# Patient Record
Sex: MALE | Race: WHITE
[De-identification: names, ages, dates, MRNs, and addresses within clinical notes are randomized per-mention and may not be internally consistent; named-entity substitution may affect disease eponyms.]

---

## 2018-12-17 ENCOUNTER — HOSPITAL ENCOUNTER (EMERGENCY)
Dept: HOSPITAL 75 - ER | Age: 23
Discharge: HOME | End: 2018-12-17
Payer: COMMERCIAL

## 2018-12-17 VITALS — DIASTOLIC BLOOD PRESSURE: 87 MMHG | SYSTOLIC BLOOD PRESSURE: 116 MMHG

## 2018-12-17 VITALS — HEIGHT: 68 IN | WEIGHT: 192.5 LBS | BODY MASS INDEX: 29.18 KG/M2

## 2018-12-17 DIAGNOSIS — F17.210: ICD-10-CM

## 2018-12-17 DIAGNOSIS — F41.9: ICD-10-CM

## 2018-12-17 DIAGNOSIS — F32.9: ICD-10-CM

## 2018-12-17 DIAGNOSIS — Z90.89: ICD-10-CM

## 2018-12-17 DIAGNOSIS — R40.2142: ICD-10-CM

## 2018-12-17 DIAGNOSIS — Z04.1: Primary | ICD-10-CM

## 2018-12-17 DIAGNOSIS — Z91.5: ICD-10-CM

## 2018-12-17 DIAGNOSIS — R40.2242: ICD-10-CM

## 2018-12-17 DIAGNOSIS — F12.10: ICD-10-CM

## 2018-12-17 DIAGNOSIS — F10.129: ICD-10-CM
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
## 2018-12-17 DIAGNOSIS — R40.2362: ICD-10-CM

## 2018-12-17 LAB
ALBUMIN SERPL-MCNC: 4.9 GM/DL (ref 3.2–4.5)
ALP SERPL-CCNC: 67 U/L (ref 40–136)
ALT SERPL-CCNC: 17 U/L (ref 0–55)
APTT PPP: YELLOW S
BACTERIA #/AREA URNS HPF: NEGATIVE /HPF
BARBITURATES UR QL: NEGATIVE
BASOPHILS # BLD AUTO: 0 10^3/UL (ref 0–0.1)
BASOPHILS NFR BLD AUTO: 0 % (ref 0–10)
BENZODIAZ UR QL SCN: NEGATIVE
BILIRUB SERPL-MCNC: 0.5 MG/DL (ref 0.1–1)
BILIRUB UR QL STRIP: NEGATIVE
BUN/CREAT SERPL: 12
CALCIUM SERPL-MCNC: 9.1 MG/DL (ref 8.5–10.1)
CHLORIDE SERPL-SCNC: 107 MMOL/L (ref 98–107)
CO2 SERPL-SCNC: 24 MMOL/L (ref 21–32)
COCAINE UR QL: NEGATIVE
CREAT SERPL-MCNC: 0.94 MG/DL (ref 0.6–1.3)
EOSINOPHIL # BLD AUTO: 0 10^3/UL (ref 0–0.3)
EOSINOPHIL NFR BLD AUTO: 0 % (ref 0–10)
ERYTHROCYTE [DISTWIDTH] IN BLOOD BY AUTOMATED COUNT: 13 % (ref 10–14.5)
FIBRINOGEN PPP-MCNC: CLEAR MG/DL
GFR SERPLBLD BASED ON 1.73 SQ M-ARVRAT: > 60 ML/MIN
GLUCOSE SERPL-MCNC: 132 MG/DL (ref 70–105)
GLUCOSE UR STRIP-MCNC: NEGATIVE MG/DL
HCT VFR BLD CALC: 43 % (ref 40–54)
HGB BLD-MCNC: 14.1 G/DL (ref 13.3–17.7)
KETONES UR QL STRIP: NEGATIVE
LEUKOCYTE ESTERASE UR QL STRIP: NEGATIVE
LYMPHOCYTES # BLD AUTO: 2.1 X 10^3 (ref 1–4)
LYMPHOCYTES NFR BLD AUTO: 29 % (ref 12–44)
MANUAL DIFFERENTIAL PERFORMED BLD QL: NO
MCH RBC QN AUTO: 29 PG (ref 25–34)
MCHC RBC AUTO-ENTMCNC: 33 G/DL (ref 32–36)
MCV RBC AUTO: 87 FL (ref 80–99)
METHADONE UR QL SCN: NEGATIVE
METHAMPHETAMINE SCREEN URINE S: NEGATIVE
MONOCYTES # BLD AUTO: 0.5 X 10^3 (ref 0–1)
MONOCYTES NFR BLD AUTO: 7 % (ref 0–12)
NEUTROPHILS # BLD AUTO: 4.6 X 10^3 (ref 1.8–7.8)
NEUTROPHILS NFR BLD AUTO: 64 % (ref 42–75)
NITRITE UR QL STRIP: NEGATIVE
OPIATES UR QL SCN: NEGATIVE
OXYCODONE UR QL: NEGATIVE
PH UR STRIP: 5 [PH] (ref 5–9)
PLATELET # BLD: 232 10^3/UL (ref 130–400)
PMV BLD AUTO: 10.2 FL (ref 7.4–10.4)
POTASSIUM SERPL-SCNC: 4.3 MMOL/L (ref 3.6–5)
PROPOXYPH UR QL: NEGATIVE
PROT SERPL-MCNC: 7.8 GM/DL (ref 6.4–8.2)
PROT UR QL STRIP: NEGATIVE
RBC # BLD AUTO: 4.92 10^6/UL (ref 4.35–5.85)
RBC #/AREA URNS HPF: (no result) /HPF
SODIUM SERPL-SCNC: 144 MMOL/L (ref 135–145)
SP GR UR STRIP: 1.01 (ref 1.02–1.02)
SQUAMOUS #/AREA URNS HPF: (no result) /HPF
TRICYCLICS UR QL SCN: NEGATIVE
UROBILINOGEN UR-MCNC: NORMAL MG/DL
WBC # BLD AUTO: 7.2 10^3/UL (ref 4.3–11)
WBC #/AREA URNS HPF: (no result) /HPF

## 2018-12-17 PROCEDURE — 81000 URINALYSIS NONAUTO W/SCOPE: CPT

## 2018-12-17 PROCEDURE — 71045 X-RAY EXAM CHEST 1 VIEW: CPT

## 2018-12-17 PROCEDURE — 70450 CT HEAD/BRAIN W/O DYE: CPT

## 2018-12-17 PROCEDURE — 80053 COMPREHEN METABOLIC PANEL: CPT

## 2018-12-17 PROCEDURE — 72170 X-RAY EXAM OF PELVIS: CPT

## 2018-12-17 PROCEDURE — 36415 COLL VENOUS BLD VENIPUNCTURE: CPT

## 2018-12-17 PROCEDURE — 85025 COMPLETE CBC W/AUTO DIFF WBC: CPT

## 2018-12-17 PROCEDURE — 80320 DRUG SCREEN QUANTALCOHOLS: CPT

## 2018-12-17 PROCEDURE — 72125 CT NECK SPINE W/O DYE: CPT

## 2018-12-17 PROCEDURE — 80306 DRUG TEST PRSMV INSTRMNT: CPT

## 2018-12-17 NOTE — DIAGNOSTIC IMAGING REPORT
PROCEDURE: CT head and CT cervical spine without contrast.



TECHNIQUE: Multiple contiguous axial images were obtained through

the brain and cervical spine without the use of intravenous

contrast. Sagittal and coronal reformations through the cervical

spine were then performed.



INDICATION:  Trauma, head and neck pain.



COMPARISON:  None 



CT head:



Ventricles normal in size shape and position.  There is no

midline shift or mass effect.  There is no hemorrhage or evidence

acute ischemia. No extra axial fluid collection is seen. The bony

calvarium, visualized paranasal sinuses and mastoids are normal.



IMPRESSION: Negative CT head



CT cervical spine:



Alignment is normal.  There is no subluxation or fracture. No

degeneration is seen. There is no paraspinous mass.



IMPRESSION: No traumatic malalignment or fracture.  Agree with

preliminary report.



 



Dictated by: 



  Dictated on workstation # [id number]

## 2018-12-17 NOTE — XMS REPORT
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 neutrophils automated count (number/volume)            12.7 10*3        
    1.8-7.8        

 

 Blood lymphocytes automated count (number/volume)            2.1 10*3         
   1.0-4.0        

 

 Blood monocytes automated count (number/volume)            0.8 10*3            
0.0-1.0        

 

 Automated eosinophil count            0.0 10*3/uL            0.0-0.3        

 

 Automated blood basophil count (count/volume)            0.0 10*3/uL          
  0.0-0.1        









 Whole blood basic metabolic panel - 16 04:00         









 Serum or plasma sodium measurement (moles/volume)            140 mmol/L       
     135-145        

 

 Serum or plasma potassium measurement (moles/volume)            3.7 mmol/L    
        3.6-5.0        

 

 Serum or plasma chloride measurement (moles/volume)            107 mmol/L     
               

 

 Carbon dioxide            20 mmol/L            21-32        

 

 Serum or plasma anion gap determination (moles/volume)            13 mmol/L   
         5-14        

 

 Serum or plasma urea nitrogen measurement (mass/volume)            8 mg/dL    
        7-18        

 

 Serum or plasma creatinine measurement (mass/volume)            0.77 mg/dL    
        0.60-1.30        

 

 Serum or plasma urea nitrogen/creatinine mass ratio            10             
NRG        

 

 Serum or plasma creatinine measurement with calculation of estimated 
glomerular filtration rate            >             NRG        

 

 Serum or plasma glucose measurement (mass/volume)            134 mg/dL        
            

 

 Serum or plasma calcium measurement (mass/volume)            8.9 mg/dL        
    8.5-10.1        









 Serum or plasma phosphate measurement (mass/volume) - 16 04:00         









 Serum or plasma phosphate measurement (mass/volume)            2.7 mg/dL      
      2.3-4.7        









 Magnesium - 16 04:00         









 Magnesium            2.0 mg/dL            1.8-2.4        









 Blood manual differential performed detection - 16 04:00         









 Blood monocytes/100 leukocytes            4 %            NRG        

 

 Manual blood segmented neutrophils/100 leukocytes            82 %            
NRG        

 

 Blood band neutrophils/100 leukocytes            2 %            NRG        

 

 Manual blood lymphocytes/100 leukocytes            10 %            NRG        

 

 Manual eosinophils/100 leukocytes in nose            0 %            NRG        

 

 Manual blood basophils/100 leukocytes            0 %            NRG        

 

 Blood lymphocytes variant/100 leukocytes            2 %            NRG        

 

 Blood erythrocyte morphology finding identification            NORMAL         
    NRG        



                                                        



Encounters

      





 ACCT No.            Visit Date/Time            Discharge            Status    
        Pt. Type            Provider            Facility            Loc./Unit  
          Complaint        

 

 [id number]            2016 22:41:00            2016 14:45:00    
        DIS            Inpatient            VICTOR HUGO BENNETT DO            [address] 
[facility]            ICU            CAFFIENE OVERDOSE,
SUICIDE ATTEMPT,HYPOKALEMIA        

 

 [id number]            2016 11:32:00            2016 14:40:00    
        DIS            Emergency            DORITA TOLBERT DO            [address] 
[facility]            ER            OVERDOSE        

 

 [id number]            2014 02:16:00            2014 02:52:00    
        DIS            Emergency            GREGORY IQBAL MD            [address] 
[facility]            ER            L EAR INJ        

 

 [id number]            10/11/2011 17:17:00                                   
   Document Registration                                                       
     

 

 [id number]            10/01/2011 22:01:00                                   
   Document Registration                                                       
     

 

 [id number]            2014 02:20:04                         [place]            
Document Registration

## 2018-12-17 NOTE — DIAGNOSTIC IMAGING REPORT
INDICATION: Trauma.



COMPARISON: 11/29/2016.



FINDINGS: A single view of the chest demonstrates clear lungs

bilaterally. The heart is normal. There is no pneumothorax.

Osseous structures are normal.



IMPRESSION: Negative chest.



Dictated by: 



  Dictated on workstation # [id number]
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
## 2018-12-17 NOTE — DIAGNOSTIC IMAGING REPORT
INDICATION: Trauma, pelvic pain.



COMPARISON: None.



FINDINGS: Single view of the pelvis demonstrates no fracture or

dislocation. Articular surfaces are normal.



IMPRESSION: Negative pelvis.



Dictated by: 



  Dictated on workstation # [id number]

## 2021-02-06 ENCOUNTER — HOSPITAL ENCOUNTER (EMERGENCY)
Dept: HOSPITAL 75 - ER | Age: 26
Discharge: HOME | End: 2021-02-06
Payer: COMMERCIAL

## 2021-02-06 VITALS — HEIGHT: 68.11 IN | WEIGHT: 220.02 LBS | BODY MASS INDEX: 33.35 KG/M2

## 2021-02-06 VITALS — DIASTOLIC BLOOD PRESSURE: 106 MMHG | SYSTOLIC BLOOD PRESSURE: 145 MMHG

## 2021-02-06 DIAGNOSIS — Z82.49: ICD-10-CM

## 2021-02-06 DIAGNOSIS — Z23: ICD-10-CM

## 2021-02-06 DIAGNOSIS — F17.290: ICD-10-CM

## 2021-02-06 DIAGNOSIS — S09.90XA: ICD-10-CM

## 2021-02-06 DIAGNOSIS — S01.01XA: Primary | ICD-10-CM
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
## 2021-02-06 DIAGNOSIS — F17.210: ICD-10-CM

## 2021-02-06 PROCEDURE — 99284 EMERGENCY DEPT VISIT MOD MDM: CPT

## 2021-02-06 PROCEDURE — 90715 TDAP VACCINE 7 YRS/> IM: CPT
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
## 2022-03-15 ENCOUNTER — HOSPITAL ENCOUNTER (OUTPATIENT)
Dept: HOSPITAL 75 - ER | Age: 27
Setting detail: OBSERVATION
LOS: 2 days | Discharge: HOME | End: 2022-03-17
Attending: ORTHOPAEDIC SURGERY | Admitting: ORTHOPAEDIC SURGERY
Payer: COMMERCIAL

## 2022-03-15 VITALS — DIASTOLIC BLOOD PRESSURE: 80 MMHG | SYSTOLIC BLOOD PRESSURE: 126 MMHG

## 2022-03-15 VITALS — SYSTOLIC BLOOD PRESSURE: 144 MMHG | DIASTOLIC BLOOD PRESSURE: 92 MMHG

## 2022-03-15 VITALS — DIASTOLIC BLOOD PRESSURE: 106 MMHG | SYSTOLIC BLOOD PRESSURE: 158 MMHG

## 2022-03-15 VITALS — DIASTOLIC BLOOD PRESSURE: 98 MMHG | SYSTOLIC BLOOD PRESSURE: 156 MMHG

## 2022-03-15 VITALS — DIASTOLIC BLOOD PRESSURE: 89 MMHG | SYSTOLIC BLOOD PRESSURE: 157 MMHG

## 2022-03-15 VITALS — DIASTOLIC BLOOD PRESSURE: 99 MMHG | SYSTOLIC BLOOD PRESSURE: 147 MMHG

## 2022-03-15 VITALS — SYSTOLIC BLOOD PRESSURE: 154 MMHG | DIASTOLIC BLOOD PRESSURE: 101 MMHG

## 2022-03-15 VITALS — SYSTOLIC BLOOD PRESSURE: 143 MMHG | DIASTOLIC BLOOD PRESSURE: 95 MMHG

## 2022-03-15 VITALS — HEIGHT: 67.72 IN | BODY MASS INDEX: 33.8 KG/M2 | WEIGHT: 220.46 LBS

## 2022-03-15 VITALS — SYSTOLIC BLOOD PRESSURE: 157 MMHG | DIASTOLIC BLOOD PRESSURE: 99 MMHG

## 2022-03-15 VITALS — SYSTOLIC BLOOD PRESSURE: 159 MMHG | DIASTOLIC BLOOD PRESSURE: 75 MMHG

## 2022-03-15 VITALS — DIASTOLIC BLOOD PRESSURE: 100 MMHG | SYSTOLIC BLOOD PRESSURE: 159 MMHG

## 2022-03-15 DIAGNOSIS — F12.10: ICD-10-CM

## 2022-03-15 DIAGNOSIS — S82.202A: Primary | ICD-10-CM

## 2022-03-15 DIAGNOSIS — F10.10: ICD-10-CM

## 2022-03-15 DIAGNOSIS — E66.9: ICD-10-CM
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
## 2022-03-15 DIAGNOSIS — I10: ICD-10-CM

## 2022-03-15 DIAGNOSIS — E72.51: ICD-10-CM

## 2022-03-15 DIAGNOSIS — S82.402A: ICD-10-CM

## 2022-03-15 LAB
ALBUMIN SERPL-MCNC: 4.8 GM/DL (ref 3.2–4.5)
ALP SERPL-CCNC: 68 U/L (ref 40–136)
ALT SERPL-CCNC: 46 U/L (ref 0–55)
BARBITURATES UR QL: NEGATIVE
BASOPHILS # BLD AUTO: 0.1 10^3/UL (ref 0–0.1)
BASOPHILS NFR BLD AUTO: 1 % (ref 0–10)
BENZODIAZ UR QL SCN: NEGATIVE
BILIRUB SERPL-MCNC: 0.4 MG/DL (ref 0.1–1)
BUN/CREAT SERPL: 10
CALCIUM SERPL-MCNC: 8.9 MG/DL (ref 8.5–10.1)
CHLORIDE SERPL-SCNC: 106 MMOL/L (ref 98–107)
CO2 SERPL-SCNC: 18 MMOL/L (ref 21–32)
COCAINE UR QL: NEGATIVE
CREAT SERPL-MCNC: 0.9 MG/DL (ref 0.6–1.3)
EOSINOPHIL # BLD AUTO: 0 10^3/UL (ref 0–0.3)
EOSINOPHIL NFR BLD AUTO: 0 % (ref 0–10)
GFR SERPLBLD BASED ON 1.73 SQ M-ARVRAT: 122 ML/MIN
GLUCOSE SERPL-MCNC: 136 MG/DL (ref 70–105)
HCT VFR BLD CALC: 47 % (ref 40–54)
HGB BLD-MCNC: 16 G/DL (ref 13.3–17.7)
LYMPHOCYTES # BLD AUTO: 3.4 10^3/UL (ref 1–4)
LYMPHOCYTES NFR BLD AUTO: 35 % (ref 12–44)
MANUAL DIFFERENTIAL PERFORMED BLD QL: NO
MCH RBC QN AUTO: 30 PG (ref 25–34)
MCHC RBC AUTO-ENTMCNC: 34 G/DL (ref 32–36)
MCV RBC AUTO: 89 FL (ref 80–99)
METHADONE UR QL SCN: NEGATIVE
METHAMPHETAMINE SCREEN URINE S: NEGATIVE
MONOCYTES # BLD AUTO: 0.7 10^3/UL (ref 0–1)
MONOCYTES NFR BLD AUTO: 7 % (ref 0–12)
NEUTROPHILS # BLD AUTO: 5.6 10^3/UL (ref 1.8–7.8)
NEUTROPHILS NFR BLD AUTO: 57 % (ref 42–75)
OPIATES UR QL SCN: POSITIVE
OXYCODONE UR QL: NEGATIVE
PLATELET # BLD: 279 10^3/UL (ref 130–400)
PMV BLD AUTO: 9.5 FL (ref 9–12.2)
POTASSIUM SERPL-SCNC: 4 MMOL/L (ref 3.6–5)
PROPOXYPH UR QL: NEGATIVE
PROT SERPL-MCNC: 8 GM/DL (ref 6.4–8.2)
SODIUM SERPL-SCNC: 140 MMOL/L (ref 135–145)
TRICYCLICS UR QL SCN: NEGATIVE
WBC # BLD AUTO: 10.9 10^3/UL (ref 4.3–11)

## 2022-03-15 PROCEDURE — 80053 COMPREHEN METABOLIC PANEL: CPT

## 2022-03-15 PROCEDURE — 96374 THER/PROPH/DIAG INJ IV PUSH: CPT

## 2022-03-15 PROCEDURE — 85014 HEMATOCRIT: CPT

## 2022-03-15 PROCEDURE — 36415 COLL VENOUS BLD VENIPUNCTURE: CPT

## 2022-03-15 PROCEDURE — 73590 X-RAY EXAM OF LOWER LEG: CPT

## 2022-03-15 PROCEDURE — 83036 HEMOGLOBIN GLYCOSYLATED A1C: CPT

## 2022-03-15 PROCEDURE — 29505 APPLICATION LONG LEG SPLINT: CPT

## 2022-03-15 PROCEDURE — 85018 HEMOGLOBIN: CPT

## 2022-03-15 PROCEDURE — 80320 DRUG SCREEN QUANTALCOHOLS: CPT

## 2022-03-15 PROCEDURE — 85025 COMPLETE CBC W/AUTO DIFF WBC: CPT

## 2022-03-15 PROCEDURE — 80306 DRUG TEST PRSMV INSTRMNT: CPT

## 2022-03-15 PROCEDURE — 87081 CULTURE SCREEN ONLY: CPT

## 2022-03-15 PROCEDURE — 76000 FLUOROSCOPY <1 HR PHYS/QHP: CPT

## 2022-03-15 RX ADMIN — SODIUM CHLORIDE, SODIUM LACTATE, POTASSIUM CHLORIDE, AND CALCIUM CHLORIDE SCH MLS/HR: 600; 310; 30; 20 INJECTION, SOLUTION INTRAVENOUS at 08:19

## 2022-03-15 RX ADMIN — MORPHINE SULFATE PRN MG: 10 INJECTION, SOLUTION INTRAMUSCULAR; INTRAVENOUS at 17:40

## 2022-03-15 RX ADMIN — SODIUM CHLORIDE, SODIUM LACTATE, POTASSIUM CHLORIDE, AND CALCIUM CHLORIDE PRN MLS/HR: 600; 310; 30; 20 INJECTION, SOLUTION INTRAVENOUS at 12:15

## 2022-03-15 RX ADMIN — SODIUM CHLORIDE, SODIUM LACTATE, POTASSIUM CHLORIDE, AND CALCIUM CHLORIDE SCH MLS/HR: 600; 310; 30; 20 INJECTION, SOLUTION INTRAVENOUS at 15:39

## 2022-03-15 RX ADMIN — OXYCODONE HYDROCHLORIDE AND ACETAMINOPHEN PRN TAB: 5; 325 TABLET ORAL at 20:37

## 2022-03-15 RX ADMIN — OXYCODONE HYDROCHLORIDE AND ACETAMINOPHEN PRN TAB: 5; 325 TABLET ORAL at 16:35

## 2022-03-15 RX ADMIN — MORPHINE SULFATE PRN MG: 10 INJECTION, SOLUTION INTRAMUSCULAR; INTRAVENOUS at 10:31

## 2022-03-15 RX ADMIN — CEFAZOLIN SODIUM SCH MLS/HR: 2 SOLUTION INTRAVENOUS at 19:40

## 2022-03-15 RX ADMIN — MORPHINE SULFATE PRN MG: 10 INJECTION, SOLUTION INTRAMUSCULAR; INTRAVENOUS at 08:20

## 2022-03-15 RX ADMIN — MORPHINE SULFATE PRN MG: 10 INJECTION, SOLUTION INTRAMUSCULAR; INTRAVENOUS at 15:20

## 2022-03-15 RX ADMIN — CEFAZOLIN SODIUM SCH MLS/HR: 2 SOLUTION INTRAVENOUS at 18:24

## 2022-03-15 RX ADMIN — MORPHINE SULFATE PRN MG: 10 INJECTION, SOLUTION INTRAMUSCULAR; INTRAVENOUS at 19:41

## 2022-03-15 RX ADMIN — SODIUM CHLORIDE, SODIUM LACTATE, POTASSIUM CHLORIDE, AND CALCIUM CHLORIDE SCH MLS/HR: 600; 310; 30; 20 INJECTION, SOLUTION INTRAVENOUS at 20:37

## 2022-03-15 RX ADMIN — MORPHINE SULFATE PRN MG: 10 INJECTION, SOLUTION INTRAMUSCULAR; INTRAVENOUS at 23:40

## 2022-03-15 RX ADMIN — SODIUM CHLORIDE, SODIUM LACTATE, POTASSIUM CHLORIDE, AND CALCIUM CHLORIDE PRN MLS/HR: 600; 310; 30; 20 INJECTION, SOLUTION INTRAVENOUS at 11:12

## 2022-03-15 NOTE — DIAGNOSTIC IMAGING REPORT
INDICATION:  Jogging and tripped in a pothole, pain.



TECHNIQUE:  AP and lateral views of the left tibia and fibula  



CORRELATION STUDY:  None



FINDINGS: 

Predominantly transversely oriented about the comminuted

fractures involving the mid diaphysis of the tibia and fibula.

More prominent bony fragmentation at the fibular fracture

fragment. There is a lateral displacement of the main distal

fracture fragments approximately half the width of the bone. Also

slight dorsal angulation present. Limited visualized portion of

the knee and ankle maintained. 

Soft tissue edema in and around the fractures. No definitive

foreign body or abnormal gas collection.



IMPRESSION: 

1.Transversely oriented, comminuted slightly displaced and

angulated fractures of the mid tibia and fibula.



Dictated by: 



  Dictated on workstation # [id number]

## 2022-03-15 NOTE — DIAGNOSTIC IMAGING REPORT
INDICATION:  Fracture, postreduction/splinting



TECHNIQUE:  AP and lateral views of the left tibia and fibula

6:40 AM



CORRELATION STUDY:  Same day



FINDINGS: 

Comminuted, transversely oriented fractures of the tibia and

fibula shafts are again demonstrated. There is a very slight

anterior and lateral displacement of distal fracture fragments

but overall improved post reduction. No significant angulation.

Splint material has been placed.



IMPRESSION: 

1.Comminuted, transversely oriented fractures of the mid and left

tibia and fibula. Very slight anterior lateral displacement of

distal fracture fragments postreduction.



Dictated by: 



  Dictated on workstation # [id number]

## 2022-03-15 NOTE — ED LOWER EXTREMITY
General


Chief Complaint:  Lower Extremity


Stated Complaint:  LEFT LEG INJURY,TRIPPED IN POT HOLE


Nursing Triage Note:  


Pt brought to ER via wheelchair. Pt states he was jogging and he fell into a pot




hole. He is c/o severe pain to the left tib/fib and there is obvious unstable 


deformity present with swelling and bruising. There is pedal pulses present. 


Patient also has multiple abrasions to the right elbow. Patient states last oral




intake was at 21:00 and it was a tall bud light beer.


Source:  patient


Exam Limitations:  no limitations





History of Present Illness


Date Seen by Provider:  Mar 15, 2022


Time Seen by Provider:  06:00


Initial Comments


This 27-year-old young man presents to the emergency room with injury to the 

left lower leg with obvious deformity.  Last night was his birthday and he 

celebrated with excessive alcohol consumption.  He went out to take a walk 

behind his home and stepped in a pothole causing the injury.  He has abrasions 

to the right elbow without any apparent bony injury.  He denies any injury to 

his head, neck, or any other body part.  He initially reported n.p.o. time of 

2200, but his wife corrects that to 0500 when she took his last beer away from 

him.  Last solid food was 1800.  Patient reports he used to drink 3 or more days

per week, but in recent weeks that has been reduced.  He denies daily alcohol 

consumption or alcohol dependence.  He denies drug use but there did appear to 

be marijuana in his pocket that fell out during assessment.





Allergies and Home Medications


Allergies


Coded Allergies:  


     No Known Drug Allergies (Unverified , 10/1/11)





Patient Home Medication List


Home Medication List Reviewed:  Yes


No Active Prescriptions or Reported Meds





Review of Systems


Constitutional:  see HPI, other (Alcohol intoxication)


EENTM:  no symptoms reported


Respiratory:  no symptoms reported


Cardiovascular:  no symptoms reported


Gastrointestinal:  no symptoms reported


Genitourinary:  no symptoms reported


Musculoskeletal:  see HPI


Skin:  no symptoms reported


Psychiatric/Neurological:  See HPI (Alcohol intoxication)





Past Medical-Social-Family Hx


Patient Social History


Tobacco Use?:  No


Smokeless Tobacco Frequency:  Never a User


Use of E-Cig and/or Vaping dev:  No


Substance use?:  Yes


Substance type:  Marijuana


Additional substance use comme:  last marijuana use was 2 days ago


Substance frequency:  Couple times a week


Alcohol Use?:  Yes


Alcohol type:  Beer


Pt feels they are or have been:  No





Immunizations Up To Date


Tetanus Booster (TDap):  Unknown


PED Vaccines UTD:  No


Influenza Vaccine Up-to-Date:  No; Not Current





Seasonal Allergies


Seasonal Allergies:  No





Past Medical History


Surgery/Hospitalization HX:  


tear duct surgery


Surgeries:  Yes (LACRIMAL DUCT, WISDOM TEETH)


Tonsillectomy


Respiratory:  No


Cardiac:  No


Neurological:  No


Reproductive Disorders:  No


Sexually Transmitted Disease:  No


HIV/AIDS:  No


Genitourinary:  No


Gastrointestinal:  No


Musculoskeletal:  No


Endocrine:  No


HEENT:  No


Cancer:  No


Psychosocial:  Yes (History of alcohol abuse)


Anxiety, Suicide Attempts, Depression


Integumentary:  No


Blood Disorders:  No


Adverse Reaction/Blood Tranf:  No





Family Medical History





Hypertension


  19 FATHER


Psychosocial problem


  21 MOTHER





Physical Exam


Vital Signs





Vital Signs - First Documented








 3/15/22





 05:54


 


Temp 36.3


 


Pulse 113


 


Resp 22


 


B/P (MAP) 135/75 (95)


 


Pulse Ox 96


 


O2 Delivery Room Air





Capillary Refill : Less Than 3 Seconds


Height, Weight, BMI


Height: 5'8.00"


Weight: 192lbs. 8.0oz. 87.546095is; 33.00 BMI


Method:Estimated


General Appearance:  WD/WN, moderate distress


HEENT:  PERRL/EOMI, normal ENT inspection, other (Oropharynx somewhat dry)


Neck:  normal inspection


Cardiovascular:  no murmur, tachycardia, other (Palpable dorsal pedal pulse in 

the left foot)


Respiratory:  lungs clear, normal breath sounds, no respiratory distress


Gastrointestinal:  normal bowel sounds, non tender, soft


Hips:  bilateral hip non-tender, bilateral hip normal inspection


Legs:  right leg non-tender, right leg normal inspection, right leg normal range

of motion; left leg bone tenderness, left leg deformity, left leg ecchymosis, 

left leg pain


Knees:  bilateral knee non-tender, bilateral knee normal inspection, bilateral 

knee no evidence of injury


Ankles:  bilateral ankle non-tender, bilateral ankle normal inspection, hemal

ateral ankle normal range of motion, bilateral ankle no evidence of injury


Feet:  bilateral foot non-tender, bilateral foot normal inspection, bilateral 

foot normal range of motion, bilateral foot no evidence of injury


Neurologic/Tendon:  normal sensation, normal tendon functions


Neurologic/Psychiatric:  CNs II-XII nml as tested, no motor/sensory deficits, 

alert, normal mood/affect, oriented x 3, other (Intoxicated but alert and 

oriented)


Skin:  normal color, warm/dry





Procedures/Interventions


Splinting and Joint Reduction :  


   Pre-Proc Neuro Vasc Exam:  normal


   Post-Proc Neuro Vasc Exam:  normal


   Hand-Made Type:  fiberglass


   Splint Application:  Long Leg (Posterior splint applied just past the knee 

with stirrup added)





Progress/Results/Core Measures


Results/Orders


Lab Results





Laboratory Tests








Test


 3/15/22


00:00 3/15/22


06:03 Range/Units


 


 


White Blood Count


 


 10.9 


 4.3-11.0


10^3/uL


 


Red Blood Count


 


 5.33 


 4.30-5.52


10^6/uL


 


Hemoglobin  16.0  13.3-17.7  g/dL


 


Hematocrit  47  40-54  %


 


Mean Corpuscular Volume  89  80-99  fL


 


Mean Corpuscular Hemoglobin  30  25-34  pg


 


Mean Corpuscular Hemoglobin


Concent 


 34 


 32-36  g/dL





 


Red Cell Distribution Width  12.3  10.0-14.5  %


 


Platelet Count


 


 279 


 130-400


10^3/uL


 


Mean Platelet Volume  9.5  9.0-12.2  fL


 


Immature Granulocyte % (Auto)  0   %


 


Neutrophils (%) (Auto)  57  42-75  %


 


Lymphocytes (%) (Auto)  35  12-44  %


 


Monocytes (%) (Auto)  7  0-12  %


 


Eosinophils (%) (Auto)  0  0-10  %


 


Basophils (%) (Auto)  1  0-10  %


 


Neutrophils # (Auto)


 


 5.6 


 1.8-7.8


10^3/uL


 


Lymphocytes # (Auto)


 


 3.4 


 1.0-4.0


10^3/uL


 


Monocytes # (Auto)


 


 0.7 


 0.0-1.0


10^3/uL


 


Eosinophils # (Auto)


 


 0.0 


 0.0-0.3


10^3/uL


 


Basophils # (Auto)


 


 0.1 


 0.0-0.1


10^3/uL


 


Immature Granulocyte # (Auto)


 


 0.0 


 0.0-0.1


10^3/uL


 


Sodium Level  140  135-145  MMOL/L


 


Potassium Level  4.0  3.6-5.0  MMOL/L


 


Chloride Level  106    MMOL/L


 


Carbon Dioxide Level  18 L 21-32  MMOL/L


 


Anion Gap  16 H 5-14  MMOL/L


 


Blood Urea Nitrogen  9  7-18  MG/DL


 


Creatinine


 


 0.90 


 0.60-1.30


MG/DL


 


Estimat Glomerular Filtration


Rate 


 122 


  





 


BUN/Creatinine Ratio  10   


 


Glucose Level  136 H   MG/DL


 


Calcium Level  8.9  8.5-10.1  MG/DL


 


Corrected Calcium    8.5-10.1  MG/DL


 


Total Bilirubin  0.4  0.1-1.0  MG/DL


 


Aspartate Amino Transf


(AST/SGOT) 


 54 H


 5-34  U/L





 


Alanine Aminotransferase


(ALT/SGPT) 


 46 


 0-55  U/L





 


Alkaline Phosphatase  68    U/L


 


Total Protein  8.0  6.4-8.2  GM/DL


 


Albumin  4.8 H 3.2-4.5  GM/DL


 


Serum Alcohol  274 H <10  MG/DL








My Orders





Orders - STUART PARK MD


Alcohol (3/15/22 06:04)


Cbc With Automated Diff (3/15/22 06:04)


Comprehensive Metabolic Panel (3/15/22 06:04)


Drug Screen Stat (Urine) (3/15/22 06:04)


Ed Iv/Invasive Line Start (3/15/22 06:04)


Morphine  Injection (Morphine  Injection (3/15/22 06:04)


Tibia/Fibula, Left, 2 Views (3/15/22 06:04)


Tibia/Fibula, Left, 2 Views (3/15/22 06:37)





Vital Signs/I&O











 3/15/22





 05:54


 


Temp 36.3


 


Pulse 113


 


Resp 22


 


B/P (MAP) 135/75 (95)


 


Pulse Ox 96


 


O2 Delivery Room Air














Blood Pressure Mean:                    95











Progress


Progress Note :  


   Time:  07:17


Progress Note


Patient was seen and examined upon arrival.  Pain was controlled with morphine 5

mg IV.  X-rays were promptly obtained and a three-way splint was applied for 

immobilization.  Dorsal pedal pulse was checked numerous times before and after 

splint placement and remained palpable.  Case was reviewed with Dr. Schwab who 

requested admission in preparation for surgery.





Diagnostic Imaging





   Diagonstic Imaging:  Xray


   Plain Films/CT/US/NM/MRI:  leg


Comments


Initial tib-fib x-rays viewed by me and report reviewed.  See report below:





NAME:   KATJA MUNGUIA


MED REC#:   [id number]


ACCOUNT#:   [id number]


PT STATUS:   REG ER


:   1995


PHYSICIAN:   STUART PARK MD


ADMIT DATE:   03/15/22/ER


***Draft***


Date of Exam:03/15/22





TIBIA/FIBULA, LEFT, 2 VIEWS








INDICATION:  Jogging and tripped in a pothole, pain.





TECHNIQUE:  AP and lateral views of the left tibia and fibula  





CORRELATION STUDY:  None





FINDINGS: 


Predominantly transversely oriented about the comminuted


fractures involving the mid diaphysis of the tibia and fibula.


More prominent bony fragmentation at the fibular fracture


fragment. There is a lateral displacement of the main distal


fracture fragments approximately half the width of the bone. Also


slight dorsal angulation present. Limited visualized portion of


the knee and ankle maintained. 


Soft tissue edema in and around the fractures. No definitive


foreign body or abnormal gas collection.





IMPRESSION: 


1.Transversely oriented, comminuted slightly displaced and


angulated fractures of the mid tibia and fibula.





  Dictated on workstation # [id number]








Dict:   03/15/22 0628


Trans:   03/15/22 0638


CVB [id number]





Interpreted by:     LACI HASKINS DO








   Diagonstic Imaging:  Xray


   Plain Films/CT/US/NM/MRI:  leg


Comments


X-rays obtained post splint.  X-rays viewed and report reviewed.  See report b

elow:





NAME:   KATJA MUNGUIA


MED REC#:   [id number]


ACCOUNT#:   [id number]


PT STATUS:   REG ER


:   1995


PHYSICIAN:   STUART PARK MD


ADMIT DATE:   03/15/22/ER


***Draft***


Date of Exam:03/15/22





TIBIA/FIBULA, LEFT, 2 VIEWS





INDICATION:  Fracture, postreduction/splinting





TECHNIQUE:  AP and lateral views of the left tibia and fibula


6:40 AM





CORRELATION STUDY:  Same day





FINDINGS: 


Comminuted, transversely oriented fractures of the tibia and


fibula shafts are again demonstrated. There is a very slight


anterior and lateral displacement of distal fracture fragments


but overall improved post reduction. No significant angulation.


Splint material has been placed.





IMPRESSION: 


1.Comminuted, transversely oriented fractures of the mid and left


tibia and fibula. Very slight anterior lateral displacement of


distal fracture fragments postreduction.





  Dictated on workstation # [id number]





Dict:   03/15/22 0700


Trans:   03/15/22 0709


CVB [id number]





Interpreted by:     LACI HASKINS DO





Departure


Communication (Admissions)


Time/Spoke to Admitting Phy:  06:50


Dr. Schwab





Impression





   Primary Impression:  


   Fracture of left tibia and fibula


   Qualified Codes:  S82.202A - Unspecified fracture of shaft of left tibia, 

   initial encounter for closed fracture; S82.402A - Unspecified fracture of 

   shaft of left fibula, initial encounter for closed fracture


   Additional Impression:  


   Alcohol intoxication


   Qualified Codes:  F10.929 - Alcohol use, unspecified with intoxication, 

   unspecified


Disposition:   ADMITTED AS INPATIENT


Condition:  Improved





Admissions


Decision to Admit Reason:  Admit from ER (Trauma)


Decision to Admit/Date:  Mar 15, 2022


Time/Decision to Admit Time:  06:50





Departure-Patient Inst.


Referrals:  


[facility] (PCP)


Primary Care Physician








NO,LOCAL PHYSICIAN (Family)


Primary Care Physician


Scripts


No Active Prescriptions or Reported Meds











STUART PARK MD        Mar 15, 2022 07:06

## 2022-03-15 NOTE — HISTORY & PHYSICAL ORTHOPEDIC
History and Physical


Subjective


Date of Exam


3/15/22


Chief Complaint


Closed fracture midshaft left tibia and fibula


HPI/Events since last exam


Memo godinez is a 27-year-old white male who was drinking last evening to 

celebrate his birthday.  His wife stated he was drinking up to about 5:00.  He w

ent outside to walk and stepped in a pothole sustaining injury to his left lower

extremity.  He was seen in the emergency room where is evaluated and x-rayed 

noted to have a midshaft fracture of the left tibia and fibula.  This was a 

closed injury.  He was splinted and admitted for surgical treatment.  He denies 

any previous injury to the leg.  His only other injuries were abrasions to the 

right elbow.


Medical, Surgical History


Previous surgery includes lack of duct surgery and tonsillectomy


Medicationsnone


Illnessnone


Social History


The patient works as a cook at [employer]


Family History


Reviewed and no additions or changes


Review of Systems


No medical history other than surgery


Allergies:  


Coded Allergies:  


     No Known Drug Allergies (Unverified , 10/1/11)


Home Meds


No Active Prescriptions or Reported Meds


Home Medication List Reviewed:  Yes





Objective


Exam


Constitutional: []The patient is a 27-year-old white male who is in mild 

distress with pain left lower leg.  He is alert and oriented


HEENT: [Within normal limit]


Neck: [No pain with palpation or range of motion]


Cardiovascular: [Regular rhythm without murmurs]


Respiratory: [Lungs clear]


Gastrointestinal: []Negative


Genitourinary: []Within normal limits


Skin: [Abrasions right elbow]


Back/Spine: [No pain with palpation]


Extremities: [Full range of motion both upper extremities.  Abrasion posterior 

aspect right elbow and proximal forearm.  Normal sensation with good cap refill.

  Equal pulses.  No deformity or crepitation.


Lower extremitiesleft lower extremity is splinted.  I opened up his splint over

 his foot and he has a good dorsalis pedis pulse.  Normal sensation of the foot 

and toes with good cap refill.  No pain with range of motion of the foot and 

toes.  No pain palpation left knee or left hip.  Gentle range of motion left hip

 causes no pain.  Right lower extremity has no pain right hip right knee or 

right ankle.  Normal sensation with good cap refill good pulses.  No deformity 

or crepitation.]


Neurologic: []Intact


Psychiatric: []


Hematologic/lymphatic/immunologic: []


Vital Signs





Vital Signs








  Date Time  Temp Pulse Resp B/P (MAP) Pulse Ox O2 Delivery O2 Flow Rate FiO2


 


3/15/22 08:53 36.6 106 20 126/80 (95) 96 Room Air  


 


3/15/22 07:30     100 Room Air  


 


3/15/22 07:13 36.3 100 18 135/75 100 Room Air  


 


3/15/22 07:03  99 18 135/75 96 Room Air  


 


3/15/22 05:54 36.3 113 22 135/75 (95) 96 Room Air  








Lab Results


Laboratory Tests


3/15/22 00:00: 


3/15/22 06:03: 


White Blood Count 10.9, Red Blood Count 5.33, Hemoglobin 16.0, Hematocrit 47, 

Mean Corpuscular Volume 89, Mean Corpuscular Hemoglobin 30, Mean Corpuscular 

Hemoglobin Concent 34, Red Cell Distribution Width 12.3, Platelet Count 279, 

Mean Platelet Volume 9.5, Immature Granulocyte % (Auto) 0, Neutrophils (%) 

(Auto) 57, Lymphocytes (%) (Auto) 35, Monocytes (%) (Auto) 7, Eosinophils (%) 

(Auto) 0, Basophils (%) (Auto) 1, Neutrophils # (Auto) 5.6, Lymphocytes # (Auto)

 3.4, Monocytes # (Auto) 0.7, Eosinophils # (Auto) 0.0, Basophils # (Auto) 0.1, 

Immature Granulocyte # (Auto) 0.0, Sodium Level 140, Potassium Level 4.0, 

Chloride Level 106, Carbon Dioxide Level 18L, Anion Gap 16H, Blood Urea Nitrogen

 9, Creatinine 0.90, Estimat Glomerular Filtration Rate 122, BUN/Creatinine 

Ratio 10, Glucose Level 136H, Calcium Level 8.9, Corrected Calcium , Total 

Bilirubin 0.4, Aspartate Amino Transf (AST/SGOT) 54H, Alanine Aminotransferase 

(ALT/SGPT) 46, Alkaline Phosphatase 68, Total Protein 8.0, Albumin 4.8H, Serum 

Alcohol 274H





Imaging


X-rays were reviewed of the right tibia and fibula which shows a comminuted 

midshaft fracture of the fibula and oblique fracture of the tibial shaft.  No 

fractures at the knee or ankle.





Assessment and Plan


Assessment


Closed midshaft fracture left tibia and fibula


Problem List


Closed midshaft fracture left tibia and fibula


Plan


Treatment options were discussed with the patient both nonsurgical and surgical.

  If he chooses nonsurgical treatment he would be treated with a long-leg cast 

for approximately 4 months.  There is a chance that the fracture may not heal or

 there may be a malunion.  Surgical treatment options were discussed including 

IM rodding and plating.  If he chooses surgical treatment I would recommend 

closed IM rodding.  I discussed risks including infection, swelling, compartment

 syndrome, neurovascular injury, potential amputation if infection or 

compartment syndrome develops.  Wound healing and nonunion.After discussion he 

would like to proceed with surgical treatment, closed IM rodding.  He is n.p.o. 

since 0500.  He has had no solid foods since last evening.His wife stated he had

 a beer at 5:00 but he does not remember.I discussed the procedure again as well

 as the risks and complications and he would like to proceed.





Final Diagonsis


Closed midshaft fracture left tibia and fibula


Level of the visit:  Level 3











SCHWAB,TERRY D MD              Mar 15, 2022 09:36

## 2022-03-15 NOTE — PROGRESS NOTE - ORTHO
Progress Note


Subjective


Date of Exam


3/15/22


Chief Complaint


Postop


HPI/Events since last exam


Annual is postop closed IM rodding of his left tibia.  When I saw him he was 

sleeping.  He spoke with me about his pain which seems to be pretty well 

controlled with oral and IV pain medication.  He states his toes feel fine with 

no numbness.  He just does not feel like eating due to little nausea


Review of Systems


Unchanged


Allergies:  


Coded Allergies:  


     No Known Drug Allergies (Unverified , 10/1/11)


Home Meds


No Active Prescriptions or Reported Meds





Objective


Exam


Constitutional: []


HEENT: []


Neck: []


Cardiovascular: []


Respiratory: []


Gastrointestinal: []


Genitourinary: []


Skin: []


Back/Spine: []


Extremities: [He can move his toes and has normal sensation.  Good dorsalis 

pedis pulse.  His toes are warm and he has good capillary refill.  No pain with 

range of motion of the toes.  He is unable to dorsiflex ankle secondary to his 

splint]


Neurologic: []


Psychiatric: []


Hematologic/lymphatic/immunologic: []


Vital Signs





Vital Signs








  Date Time  Temp Pulse Resp B/P (MAP) Pulse Ox O2 Delivery O2 Flow Rate FiO2


 


3/15/22 20:06      Room Air  


 


3/15/22 19:35 36.8 104 18 167/127 (140) 96 Room Air  


 


3/15/22 15:30 37.1 98 20 158/106 (123) 95 Nasal Cannula 2.50 


 


3/15/22 14:55      Room Air 3 


 


3/15/22 14:55 36.4  20 157/99 (118) 95 Nasal Cannula 3 


 


3/15/22 14:45      Room Air 3 


 


3/15/22 14:40   20 157/89 (111) 95 Nasal Cannula 3 


 


3/15/22 14:30   20 156/98 (117) 94 Nasal Cannula 3 


 


3/15/22 14:30      Room Air 3 


 


3/15/22 14:20   20 159/100 (119) 94 OxyMask 3 


 


3/15/22 14:15      OxyMask 3 


 


3/15/22 14:10   20 147/99 (115) 96 OxyMask 3 


 


3/15/22 14:00      OxyMask 6 


 


3/15/22 14:00   20 144/92 (109) 100 OxyMask 5 


 


3/15/22 13:56 36.4  20 143/95 (111) 100 OxyMask 6 


 


3/15/22 13:56      OxyMask 6 


 


3/15/22 08:53 36.6 106 20 126/80 (95) 96 Room Air  


 


3/15/22 07:30     100 Room Air  


 


3/15/22 07:13 36.3 100 18 135/75 100 Room Air  


 


3/15/22 07:03  99 18 135/75 96 Room Air  


 


3/15/22 05:54 36.3 113 22 135/75 (95) 96 Room Air  








Lab Results


Laboratory Tests


3/15/22 06:03: 


White Blood Count 10.9, Red Blood Count 5.33, Hemoglobin 16.0, Hematocrit 47, 

Mean Corpuscular Volume 89, Mean Corpuscular Hemoglobin 30, Mean Corpuscular 

Hemoglobin Concent 34, Red Cell Distribution Width 12.3, Platelet Count 279, 

Mean Platelet Volume 9.5, Immature Granulocyte % (Auto) 0, Neutrophils (%) 

(Auto) 57, Lymphocytes (%) (Auto) 35, Monocytes (%) (Auto) 7, Eosinophils (%) 

(Auto) 0, Basophils (%) (Auto) 1, Neutrophils # (Auto) 5.6, Lymphocytes # (Auto)

 3.4, Monocytes # (Auto) 0.7, Eosinophils # (Auto) 0.0, Basophils # (Auto) 0.1, 

Immature Granulocyte # (Auto) 0.0, Sodium Level 140, Potassium Level 4.0, 

Chloride Level 106, Carbon Dioxide Level 18L, Anion Gap 16H, Blood Urea Nitrogen

 9, Creatinine 0.90, Estimat Glomerular Filtration Rate 122, BUN/Creatinine 

Ratio 10, Glucose Level 136H, Calcium Level 8.9, Corrected Calcium , Total 

Bilirubin 0.4, Aspartate Amino Transf (AST/SGOT) 54H, Alanine Aminotransferase 

(ALT/SGPT) 46, Alkaline Phosphatase 68, Total Protein 8.0, Albumin 4.8H, Serum 

Alcohol 274H


3/15/22 09:39: 


Urine Opiates Screen POSITIVEH, Urine Oxycodone Screen NEGATIVE, Urine Methadone

 Screen NEGATIVE, Urine Propoxyphene Screen NEGATIVE, Urine Barbiturates Screen 

NEGATIVE, Ur Tricyclic Antidepressants Screen NEGATIVE, Urine Phencyclidine 

Screen NEGATIVE, Urine Amphetamines Screen NEGATIVE, Urine Methamphetamines 

Screen NEGATIVE, Urine Benzodiazepines Screen NEGATIVE, Urine Cocaine Screen 

NEGATIVE, Urine Cannabinoids Screen POSITIVEH





Assessment and Plan


Assessment


Doing well postop


Problem List


Unchanged


Plan


Continue present treatment





Final Diagonsis


Closed midshaft fracture left tibia and fibula


Level of the visit:  Level 3











SCHWAB,TERRY D MD              Mar 15, 2022 20:24

## 2022-03-15 NOTE — OPERATIVE REPORT - ORTHO
Operative Report


Surgeon (s)/Assistant (s)


Surgeon


TERRY SCHWAB MD


Assistant


n/a





Pre-Operative Diagnosis


Closed midshaft fracture left tibia and fibula





Post-Operative Diagnosis


same





Operative Report


Date of Procedure:  Mar 15, 2022


Name of Procedure Performed:  


Closed IM rodding left tibia fracture with a 315 mm x 11 mm bebe with 2


proximal and 2 distal locking screws


Description & Findings


The patient was seen in the preoperative area and he had no further questions or

concerns.  He was then taken to the operating room in his hospital bed and 

placed on the OR table.  He was given 2 g Ancef IV preoperatively.  After 

administration of general anesthesia his splint was removed from his left lower 

extremity.  He had a good dorsalis pedis and posterior tibial pulse.  Good 

capillary refill of his toes.  Soft compartments.  No open wounds.  He did have 

some bruising over the fracture site anterior medial tibia.  Geovany was placed on

the left thigh and the left foot and leg were prepped and draped in the usual 

sterile manner.  The tourniquet was not inflated.  Image was used to visualize 

the knee and a guidepin was placed over the central aspect of the proximal 

tibia.  The skin was marked after marking landmarks.  Incision was then made 

over the patella tendon from the inferior pole the patella to the tibial 

tubercle.  This was taken down through subtenons tissue.  Bleeders were 

cauterized.  The peritenon was then split and the tendon was then split digital 

dissection was carried down to the anterior aspect of the proximal tibia.  The 

fat was elevated minimally to allow good exposure to the anterior aspect of the 

proximal tibia.  A guidewire was then inserted and visualized on the AP view and

was in good position central in the tibia and on the lateral view in good 

position.  This was then overreamed with the reamer with a tissue protector.  A 

long guidewire was then inserted into the proximal tibia fracture was reduced 

and a guidewire was advanced into the distal tibia down to just above the 

articular surface of the ankle.  This was measured and a 315 mm bebe was 

selected.  The tibia was reamed up to 12-1/2 mm starting initially with 8.5 mm 

and cutter and then advancing and 1.0 mm then 0.5 mm reamers up to again 12.5 

mm.  Fracture was held reduced reaming across fracture site.  Good cortical 

reaming was noted proximal to the fracture with the last few passes of the 

reamer.  At this point the 350 mm x 11 mm bebe was inserted down to just above 

the fracture site.  The fracture was again held reduced as the bebe was advanced 

down into the distal segment.  Again this was placed to approximately 1 cm above

the distal tibial articular surface.  The oblique fracture was reduced and 

showed no comminution and good reduction was noted with no distraction.  At this

point using the guide to proximal locking screws were inserted in the static 

holes.  These were measured after drilling and appropriate length screws were 

inserted.  These were then checked and found within the bebe in good position.  

Then distally screws were inserted from medial to lateral again drilling 

measuring and inserting appropriate length screws.  Again the fracture remained 

in excellent alignment.  X-rays were obtained of the proximal and distal tibia 

as well as the fracture site AP and lateral views.  This point the wounds were 

irrigated with normal saline.  The patella tendon was closed with 0 Vicryl.  

Subcutaneous tissue with 2-0 Vicryl and skin with skin clips.  Small nick 

incisions for the 4 locking screws were closed with staples.  Wounds were 

dressed with antibiotic ointment, Adaptic and 4 x 4's.  Knee was wrapped with 

Curlex and the foot and lower leg were wrapped with web roll.  A posterior and 

sugar tong splint were applied to the lower leg which were wrapped with Ace 

wraps up to above the knee.  Prior to applying the splint, the compartments were

soft posterior as well as anterior and lateral.  Patient had a good dorsalis 

pedis and posterior tibial pulse with good capillary refill of the foot and toes

and pink toes.


After applying the splint the patient was transferred to recovery room in good 

condition, he tolerated procedure well.





Blood loss75 mL


Replacementnone


Drainsnone


Complicationsnone





n/a


Anesthesia Type


General


Estimated Blood Loss


75 mL


Packing


none.


Specimen(s) collected/removed


None











SCHWAB,TERRY D MD              Mar 15, 2022 14:21

## 2022-03-15 NOTE — PROGRESS NOTE - ORTHO
Progress Note


Subjective


Date of Exam


3/15/22


Chief Complaint


Postop visit


HPI/Events since last exam


Memo just returned to the floor from recovery room.  He has not had any pain 

since he returned to the floor.  He states he has quite a bit of pain although 

this talking to me he does not seem to be in that much pain.Apparently he they 

were able to control his pain very well in recovery room as he slept most of the

time.


Review of Systems


No changes


Allergies:  


Coded Allergies:  


     No Known Drug Allergies (Unverified , 10/1/11)


Home Meds


No Active Prescriptions or Reported Meds





Objective


Exam


Constitutional: []


HEENT: []


Neck: []


Cardiovascular: []


Respiratory: []


Gastrointestinal: []


Genitourinary: []


Skin: []


Back/Spine: []


Extremities: [Is good dorsalis pedis pulse.  Is able to move his toes with 

minimal increased pain.  Has normal sensation to his dorsum of the foot and toes

 with good cap refill]


Neurologic: []


Psychiatric: []


Hematologic/lymphatic/immunologic: []


Vital Signs





Vital Signs








  Date Time  Temp Pulse Resp B/P (MAP) Pulse Ox O2 Delivery O2 Flow Rate FiO2


 


3/15/22 14:30   20 156/98 (117) 94 Nasal Cannula 3 


 


3/15/22 14:20   20 159/100 (119) 94 OxyMask 3 


 


3/15/22 14:10   20 147/99 (115) 96 OxyMask 3 


 


3/15/22 14:00   20 144/92 (109) 100 OxyMask 5 


 


3/15/22 13:56 36.4  20 143/95 (111) 100 OxyMask 6 


 


3/15/22 08:53 36.6 106 20 126/80 (95) 96 Room Air  


 


3/15/22 07:30     100 Room Air  


 


3/15/22 07:13 36.3 100 18 135/75 100 Room Air  


 


3/15/22 07:03  99 18 135/75 96 Room Air  


 


3/15/22 05:54 36.3 113 22 135/75 (95) 96 Room Air  








Lab Results


Laboratory Tests


3/15/22 06:03: 


White Blood Count 10.9, Red Blood Count 5.33, Hemoglobin 16.0, Hematocrit 47, 

Mean Corpuscular Volume 89, Mean Corpuscular Hemoglobin 30, Mean Corpuscular 

Hemoglobin Concent 34, Red Cell Distribution Width 12.3, Platelet Count 279, 

Mean Platelet Volume 9.5, Immature Granulocyte % (Auto) 0, Neutrophils (%) 

(Auto) 57, Lymphocytes (%) (Auto) 35, Monocytes (%) (Auto) 7, Eosinophils (%) 

(Auto) 0, Basophils (%) (Auto) 1, Neutrophils # (Auto) 5.6, Lymphocytes # (Auto)

 3.4, Monocytes # (Auto) 0.7, Eosinophils # (Auto) 0.0, Basophils # (Auto) 0.1, 

Immature Granulocyte # (Auto) 0.0, Sodium Level 140, Potassium Level 4.0, 

Chloride Level 106, Carbon Dioxide Level 18L, Anion Gap 16H, Blood Urea Nitrogen

 9, Creatinine 0.90, Estimat Glomerular Filtration Rate 122, BUN/Creatinine 

Ratio 10, Glucose Level 136H, Calcium Level 8.9, Corrected Calcium , Total 

Bilirubin 0.4, Aspartate Amino Transf (AST/SGOT) 54H, Alanine Aminotransferase 

(ALT/SGPT) 46, Alkaline Phosphatase 68, Total Protein 8.0, Albumin 4.8H, Serum 

Alcohol 274H


3/15/22 09:39: 


Urine Opiates Screen POSITIVEH, Urine Oxycodone Screen NEGATIVE, Urine Methadone

 Screen NEGATIVE, Urine Propoxyphene Screen NEGATIVE, Urine Barbiturates Screen 

NEGATIVE, Ur Tricyclic Antidepressants Screen NEGATIVE, Urine Phencyclidine 

Screen NEGATIVE, Urine Amphetamines Screen NEGATIVE, Urine Methamphetamines 

Screen NEGATIVE, Urine Benzodiazepines Screen NEGATIVE, Urine Cocaine Screen 

NEGATIVE, Urine Cannabinoids Screen POSITIVEH





Assessment and Plan


Assessment


Doing well postop


Problem List


Unchanged


Plan


Continue elevation and ice, pain management





Final Diagonsis


Closed fracture midshaft right tibia and fibula


Level of the visit:  Level 3











SCHWAB,TERRY D MD              Mar 15, 2022 15:19

## 2022-03-16 VITALS — DIASTOLIC BLOOD PRESSURE: 95 MMHG | SYSTOLIC BLOOD PRESSURE: 150 MMHG

## 2022-03-16 VITALS — DIASTOLIC BLOOD PRESSURE: 76 MMHG | SYSTOLIC BLOOD PRESSURE: 136 MMHG

## 2022-03-16 VITALS — DIASTOLIC BLOOD PRESSURE: 96 MMHG | SYSTOLIC BLOOD PRESSURE: 151 MMHG

## 2022-03-16 VITALS — DIASTOLIC BLOOD PRESSURE: 69 MMHG | SYSTOLIC BLOOD PRESSURE: 159 MMHG

## 2022-03-16 VITALS — SYSTOLIC BLOOD PRESSURE: 134 MMHG | DIASTOLIC BLOOD PRESSURE: 83 MMHG

## 2022-03-16 VITALS — DIASTOLIC BLOOD PRESSURE: 94 MMHG | SYSTOLIC BLOOD PRESSURE: 154 MMHG

## 2022-03-16 VITALS — SYSTOLIC BLOOD PRESSURE: 119 MMHG | DIASTOLIC BLOOD PRESSURE: 69 MMHG

## 2022-03-16 LAB
HCT VFR BLD CALC: 41 % (ref 40–54)
HGB BLD-MCNC: 13.7 G/DL (ref 13.3–17.7)

## 2022-03-16 RX ADMIN — OXYCODONE HYDROCHLORIDE AND ACETAMINOPHEN PRN TAB: 5; 325 TABLET ORAL at 21:25

## 2022-03-16 RX ADMIN — OXYCODONE HYDROCHLORIDE AND ACETAMINOPHEN PRN TAB: 5; 325 TABLET ORAL at 08:06

## 2022-03-16 RX ADMIN — ASPIRIN SCH MG: 81 TABLET ORAL at 08:08

## 2022-03-16 RX ADMIN — CEFAZOLIN SODIUM SCH MLS/HR: 2 SOLUTION INTRAVENOUS at 05:31

## 2022-03-16 RX ADMIN — OXYCODONE HYDROCHLORIDE AND ACETAMINOPHEN PRN TAB: 5; 325 TABLET ORAL at 12:22

## 2022-03-16 RX ADMIN — OXYCODONE HYDROCHLORIDE AND ACETAMINOPHEN PRN TAB: 5; 325 TABLET ORAL at 16:22

## 2022-03-16 NOTE — ANESTHESIA-GENERAL POST-OP
General


Patient Condition


Mental Status/LOC:  Same as Preop


Cardiovascular:  Satisfactory


Nausea/Vomiting:  Absent


Respiratory:  Satisfactory


Pain:  Controlled


Complications:  Absent





Post Op Complications


Complications


None





Follow Up Care/Instructions


Patient Instructions


None needed.





Anesthesia/Patient Condition


Patient Condition


Patient is doing well, no complaints, stable vital signs, no apparent adverse 

anesthesia problems.   


No complications reported per nursing.











RADHA MERCADO CRNA          Mar 16, 2022 14:04

## 2022-03-16 NOTE — CONSULTATION - HOSPITALIST
HPI


History of Present Illness:


HPI/Chief Complaint


Memo Multani is a 27 year old male with PMH alcohol abuse, tobacco abuse, 

marijuana abuse, obesity, anxiety, depression, who presented after a fall with a

tib/fib fracture. Orthopedic surgery is primary and performed surgical repair 

3/15. His blood pressure has been elevated. His pain is now well controlled. The

hospitalist service has been consulted to assist with his hypertension. He does 

not follow with a doctor. He does not take any medications regularly. He says 

that everyone in his family dies of heart attacks. He vapes. He drinks alcohol. 

He smokes marijuana.


Source:  patient, family


Exam Limitations:  no limitations


Date Seen


3/16/22


Attending Physician


Schwab,Terry D MD


[facility]


Referring Physician





Date of Admission


Mar 15, 2022 at 06:50





Home Medications & Allergies


Home Medications


Reviewed patient Home Medication Reconciliation performed by pharmacy medication

reconciliations technician and/or nursing.


Patients Allergies have been reviewed.





Allergies





Allergies


Coded Allergies


  No Known Drug Allergies ([id number]/1/11)








Past Medical-Social-Family Hx


Patient Social History


Tobacco Use?:  No


Smokeless Tobacco Frequency:  Never a User


Use of E-Cig and/or Vaping dev:  No


Substance use?:  Yes


Substance type:  Marijuana


Additional substance use comme:  last marijuana use was 2 days ago


Substance frequency:  Couple times a week


Alcohol Use?:  Yes


Alcohol type:  Beer


Additional Alcohol Comments:  last alcohol was on 03/14 at 21:00


Pt feels they are or have been:  No





Immunizations Up To Date


Tetanus Booster (TDap):  More Than 5 Years


PED Vaccines UTD:  No





Seasonal Allergies


Seasonal Allergies:  No





Current Status


Advance Directives:  No


Communicates:  Verbally


Primary Language:  English


Preferred Spoken Language:  English


Is interpretation needed?:  No





Past Medical History


Surgeries:  Tonsillectomy


Currently Using CPAP:  No


Currently Using BIPAP:  No


Sexually Transmitted Disease:  No


HIV/AIDS:  No


Anxiety, Suicide Attempts, Depression


Blood Disorders:  No


Adverse Reaction/Blood Tranf:  No





Family Medical History





Hypertension


  21 FATHER


Psychosocial problem


  21 MOTHER





Review of Systems


Constitutional:  no symptoms reported


EENTM:  no symptoms reported


Respiratory:  no symptoms reported


Cardiovascular:  no symptoms reported


Gastrointestinal:  no symptoms reported


Genitourinary:  no symptoms reported


Musculoskeletal:  no symptoms reported


Skin:  no symptoms reported


Psychiatric/Neurological:  No Symptoms Reported





Physical Exam


Physical Exam


Vital Signs





Vital Signs - First Documented








 3/15/22





 05:54


 


Temp 36.3


 


Pulse 113


 


Resp 22


 


B/P (MAP) 135/75 (95)


 


Pulse Ox 96


 


O2 Delivery Room Air





Capillary Refill : Less Than 3 SecondsLess Than 3 Seconds


Height, Weight, BMI


Height: 5'8.00"


Weight: 192lbs. 8.0oz. 87.119875ac; 33.80 BMI


Method:Estimated


General Appearance:  No Apparent Distress, WD/WN


HEENT:  PERRL/EOMI, Pharynx Normal


Neck:  Normal Inspection, Supple


Respiratory:  Lungs Clear, No Respiratory Distress


Cardiovascular:  Regular Rate, Rhythm, No Edema, No Murmur


Gastrointestinal:  Normal Bowel Sounds, Non Tender, Soft


Extremity:  Normal Inspection, Non Tender, No Pedal Edema


Neurologic/Psychiatric:  Alert, Oriented x3, No Motor/Sensory Deficits, Normal 

Mood/Affect


Skin:  Normal Color, Warm/Dry





Results


Results/Procedures


Labs


Laboratory Tests


3/15/22 06:03








3/16/22 05:36








Patient resulted labs reviewed.


Imaging:  Reviewed Imaging Report





Assessment/Plan


Assessment and Plan


Assess & Plan/Chief Complaint


Hypertension


   Begin Lisinoprl





Hyperglycemia


   Check A1C





Obesity


   Diet and exercise modifications recommended





Tobacco abuse


Alcohol abuse


Marijuana abuse


   Cessation recommended





Diagnosis/Problems


Diagnosis/Problems





(1) Fracture of left tibia and fibula


Status:  Acute


Qualifiers:  


   Encounter type:  initial encounter  Fracture type:  closed  Qualified Codes: 

S82.202A - Unspecified fracture of shaft of left tibia, initial encounter for 

closed fracture; S82.402A - Unspecified fracture of shaft of left fibula, 

initial encounter for closed fracture


(2) HTN (hypertension)


Status:  Acute


Qualifiers:  


   Hypertension type:  primary hypertension  Qualified Codes:  I10 - Essential 

(primary) hypertension











AYDEN PIZANO MD              Mar 16, 2022 19:39

## 2022-03-16 NOTE — PROGRESS NOTE - ORTHO
Progress Note


Subjective


Date of Exam


3/16/22


Chief Complaint


POD#1 Closed IM rodding left tibia fracture


HPI/Events since last exam


Memo is doing fairly well this morning.  He states he still has some pain that

is fairly well controlled now with oral pain meds.  Occasionally get some pain 

shooting down his leg or little bit of numbness or tingling.  Occasionally have 

some increased pain anterior aspect of the knee.  He states his toes do not feel

numb although occasionally he will be a little funny feeling.  He has not been 

up with therapy yet.


Review of Systems


Reviewed and no additions or change


Allergies:  


Coded Allergies:  


     No Known Drug Allergies (Unverified , 10/1/11)


Home Meds


No Active Prescriptions or Reported Meds





Objective


Exam


Constitutional: []


HEENT: []


Neck: []


Cardiovascular: []


Respiratory: []


Gastrointestinal: []


Genitourinary: []


Skin: []


Back/Spine: []


Extremities: [Splint is intact.  Good dorsalis pedis pulse.  Good cap refill in 

his toes and dorsum of his foot.  He can move his toes without any increased 

pain.Normal sensation in his toes.]


Neurologic: []


Psychiatric: []


Hematologic/lymphatic/immunologic: []


Vital Signs





Vital Signs








  Date Time  Temp Pulse Resp B/P (MAP) Pulse Ox O2 Delivery O2 Flow Rate FiO2


 


3/16/22 07:55 35.9 90 20 150/95 (113) 94 Room Air  


 


3/16/22 04:23 35.9 90 16 159/69 (99) 96 Room Air  


 


3/15/22 23:56 36.1 87 16 154/101 (118) 96 Room Air  


 


3/15/22 20:06      Room Air  


 


3/15/22 19:35 36.8 104 18 167/127 (140) 96 Room Air  


 


3/15/22 15:30 37.1 98 20 158/106 (123) 95 Nasal Cannula 2.50 


 


3/15/22 14:55      Room Air 3 


 


3/15/22 14:55 36.4  20 157/99 (118) 95 Nasal Cannula 3 


 


3/15/22 14:45      Room Air 3 


 


3/15/22 14:40   20 157/89 (111) 95 Nasal Cannula 3 


 


3/15/22 14:30   20 156/98 (117) 94 Nasal Cannula 3 


 


3/15/22 14:30      Room Air 3 


 


3/15/22 14:20   20 159/100 (119) 94 OxyMask 3 


 


3/15/22 14:15      OxyMask 3 


 


3/15/22 14:10   20 147/99 (115) 96 OxyMask 3 


 


3/15/22 14:00      OxyMask 6 


 


3/15/22 14:00   20 144/92 (109) 100 OxyMask 5 


 


3/15/22 13:56 36.4  20 143/95 (111) 100 OxyMask 6 


 


3/15/22 13:56      OxyMask 6 














I & O 


 


 3/16/22





 07:00


 


Intake Total 2220 ml


 


Output Total 2975 ml


 


Balance -755 ml








Lab Results


Laboratory Tests


3/15/22 09:39: 


Urine Opiates Screen POSITIVEH, Urine Oxycodone Screen NEGATIVE, Urine Methadone

 Screen NEGATIVE, Urine Propoxyphene Screen NEGATIVE, Urine Barbiturates Screen 

NEGATIVE, Ur Tricyclic Antidepressants Screen NEGATIVE, Urine Phencyclidine 

Screen NEGATIVE, Urine Amphetamines Screen NEGATIVE, Urine Methamphetamines 

Screen NEGATIVE, Urine Benzodiazepines Screen NEGATIVE, Urine Cocaine Screen 

NEGATIVE, Urine Cannabinoids Screen POSITIVEH


3/16/22 05:36: 


Hemoglobin 13.7, Hematocrit 41





Microbiology


3/15/22 MRSA Screen - Final, Complete


          MRSA not isolated





Assessment and Plan


Assessment


Doing well first day postop


Problem List


Closed midshaft fracture left tibia and fibula


Hypertension


Plan


Continue present treatment.  Physical therapy today.  If he is comfortable he 

can go home this afternoon if not probably tomorrow.


I talked to him about his blood pressure.  Initially I thought it was from his 

fracture pain and postop pain but now that his pain is poorly well under control

 it still elevated.  He states he has been told in the past that he may have a 

little bit of high blood pressure.  His father had a high blood pressure as 

well.


I spoke with Dr. Champagne about it and he will see him in consultation and 

probably put him on some blood pressure medication.  With his pain fairly well c

ontrolled I would not expect his blood pressure to still be elevated from that.





Final Diagonsis


Closed midshaft fracture left tibia and fibula Status post IM rodding


Level of the visit:  Level 3











SCHWAB,TERRY D MD              Mar 16, 2022 09:52

## 2022-03-16 NOTE — PHYSICAL THERAPY EVALUATION
PT Evaluation-General


Medical Diagnosis


Admission Date


Mar 15, 2022 at 06:50


Medical Diagnosis:  left tib/fib fx


Onset Date:  Mar 15, 2022





Therapy Diagnosis


Therapy Diagnosis:  impaired mobility





Height/Weight


Height (Feet):  5


Height (Inches):  8.00


Weight (Pounds):  192


Weight (Ounces):  8.0





Precautions


Precautions/Isolations:  Fall Prevention, Standard Precautions





Weight Bear Status


Right Lower Extremity:  Right


Full Weight Bearing


Left Lower Extremity:  Left


Non Weight Bearing





Referral


Physician:  Schwab


Reason for Referral:  Evaluation/Treatment





Medical History


Additional Medical History


Past Medical History


Surgery/Hospitalization HX:  


tear duct surgery


Surgeries:  Yes (LACRIMAL DUCT, WISDOM TEETH)


Tonsillectomy


Respiratory:  No


Cardiac:  No


Neurological:  No


Reproductive Disorders:  No


Sexually Transmitted Disease:  No


HIV/AIDS:  No


Genitourinary:  No


Gastrointestinal:  No


Musculoskeletal:  No


Endocrine:  No


HEENT:  No


Cancer:  No


Psychosocial:  Yes (History of alcohol abuse)


Anxiety, Suicide Attempts, Depression


Integumentary:  No


Blood Disorders:  No


Adverse Reaction/Blood Tranf:  No


Reviewed History:  Yes





Social History


Home:  Single Level


Current Living Status:  Spouse


Entry Into Home:  Stairs Without Railing


PT Steps Into Home:  2





Prior


Prior Level of Function


SCALE: Activities may be completed with or without assistive devices.





6-Indepedent-patient completes the activity by him/herself with no assistance 

from a helper.


5-Set-up or Clean-up Assistance-helper sets up or cleans up; patient completes 

activity. [place] assists only prior to or  


    following the activity.


4-Supervision or Touching Assistance-helper provides verbal cues and/or 

touching/steadying and/or contact guard assistance as patient completes activi

ty. Assistance may be provided   


    throughout the activity or intermittently.


3-Partial/Moderate Assistance-helper does LESS THAN HALF the effort. [place] 

lifts, holds or supports trunk or limbs, but provides less than half the effort.


2-Substantial/Maximal Assistance-helper does MORE THAN HALF the effort. [place] 

lifts or holds trunk or limbs and provides more than half the effort.


5-Oqazemici-dwsfxi does ALL the effort. Patient does none of the effort to 

complete the activity. Or, the assistance of 2 or more helpers is required for 

the patient to complete the  


    activity.


If activity was not attempted, code reason:


7-Patient Refused.


9-Not Applicable-not attempted and the patient did not perform the activity 

before the current illness, exacerbation or injury.


10-Not Attempted due to Environmental Limitations-(lack of equipment, weather 

restraints, etc.).


88-Not Attempted due to Medical Conditions or Safety Concerns.


Bed Mobility:  6


Transfers (B,C,W/C):  6


Gait:  6


Stairs:  6


Indoor Mobility (Ambulation):  Independent


Stairs:  Independent





PT Evaluation-Current


Subjective


Patient in bed pre tx, agrees to PT, has 8/10 pain in his left knee





Pt/Family Goals


to be independent at home





Objective


Patient Orientation:  Person, Place, Situation


Attachments:  IV





ROM/Strength


ROM Lower Extremities


WNL except left knee and ankle





Sensory


Vision:  Functional


Hearing:  Functional


Sensation Right Lower Extremit:  Intact


Sensation Left Lower Extremity:  Intact





Transfers


Roll Left to Right (QC):  6


Lying to Sitting/Side of Bed(Q:  3


Sit to Stand (QC):  3


Chair/Bed-to-Chair Xfer(QC):  3


Patient has a lot of pain in left leg, needs to have it supported during supine 

to sit and during the transfer to recliner.  Patient performed the transfer to 

recliner with min assist, he was compliant with NWB on the left leg.





Balance


Sitting Static:  Normal


Sitting Dynamic:  Normal


Standing Static:  Fair


 Standing Dynamic:  Fair





Treatment


BLE seated exercises x10 (toe curls on the left foot, LAQ)





Assessment/Needs


Patient in recliner post tx with nurse call, phone, tray, all needs met.  

Patient has impaired mobility, pain limits his movement and needs assist with 

supporting his left leg during activity.


Rehab Potential:  Fair





PT Long Term Goals


Long Term Goals


PT Long Term Goals Time Frame:  Mar 23, 2022


Roll Left & Right (QC):  6


Sit to Lying (QC):  6


Lying-Sitting on Side/Bed(QC):  6


Sit to Stand (QC):  6


Chair/Bed-to-Chair Xfer(QC):  6


Walk 10 feet (QC):  4





PT Plan


Problem List


Problem List:  Activity Tolerance, Functional Strength, Safety, Balance, Gait, 

Transfer, Bed Mobility, ROM





Treatment/Plan


Treatment Plan:  Continue Plan of Care


Treatment Plan:  Bed Mobility, Education, Functional Activity Vera, Functional 

Strength, Gait, Safety, Therapeutic Exercise, Transfers


Treatment Duration:  Mar 23, 2022


Frequency:  11 times per week


Estimated Hrs Per Day:  .25 hour per day


Patient and/or Family Agrees t:  Yes





Safety Risks/Education


Patient Education:  Transfer Techniques, Reviewed Precautions, Correct 

Positioning, Safety Issues


Teaching Recipient:  Patient


Teaching Methods:  Demonstration, Discussion


Response to Teaching:  Reinforcement Needed





Discharge Recommendations


Plan


Patient will perform bed mobility and transfer training, balance and endurance 

training, functional strengthening, stair training, gait training, and 

education, to improve functional mobility and independence at home.


Therapy Discharge Recommendati:  Home & Family, Post Acute PT





Time/GCodes


Time In:  0910


Time Out:  0925


Total Billed Treatment Time:  15


Total Billed Treatment


1 visit


EV 15'











BRUNA PRATT PT                Mar 16, 2022 09:49

## 2022-03-16 NOTE — PHYSICAL THERAPY DAILY NOTE
PT Daily Note-Current


Subjective


Pt. hesitant to participate in therapy. States he cant leave here today ,  not 

sure if he can move, rates pain in left "shin" area 8/10.  Pt moans and holds 

breath and grimaces with all movement. This PTA suggests pt move himself as much

as possible and take control of his movement and his progress.  Pt. agrees but r

eluctantly





Pain





   Numeric Pain Scale:  8


   Location:  Left


   Location Body Site:  Calf (ant tib)


   Pain Description:  Stabbing





Mental Status


Patient Orientation:  Normal For Age





Transfers


SCALE: Activities may be completed with or without assistive devices.





6-Indepedent-patient completes the activity by him/herself with no assistance 

from a helper.


5-Set-up or Clean-up Assistance-helper sets up or cleans up; patient completes 

activity. [place] assists only prior to or  


    following the activity.


4-Supervision or Touching Assistance-helper provides verbal cues and/or 

touching/steadying and/or contact guard assistance as patient completes 

activity. Assistance may be provided   


    throughout the activity or intermittently.


3-Partial/Moderate Assistance-helper does LESS THAN HALF the effort. [place] 

lifts, holds or supports trunk or limbs, but provides less than half the effort.


2-Substantial/Maximal Assistance-helper does MORE THAN HALF the effort. [place] 

lifts or holds trunk or limbs and provides more than half the effort.


0-Xmjhedeuq-ccnqnb does ALL the effort. Patient does none of the effort to 

complete the activity. Or, the assistance of 2 or more helpers is required for 

the patient to complete the  


    activity.


If activity was not attempted, code reason:


7-Patient Refused.


9-Not Applicable-not attempted and the patient did not perform the activity 

before the current illness, exacerbation or injury.


10-Not Attempted due to Environmental Limitations-(lack of equipment, weather 

restraints, etc.).


88-Not Attempted due to Medical Conditions or Safety Concerns.


Sit to Stand (QC):  4


Chair/Bed-to-Chair Xfer(QC):  4


pt. was reclined in chair and needed significant time to lower leg rest as he 

stated it was severe pain.





Weight Bearing


Right Lower Extremity:  Right


Full Weight Bearing


Left Lower Extremity:  Left


Non Weight Bearing





Gait Training


Does the Patient Walk?:  Yes


Walk 10 feet (QC):  4


Walk 50 ft with 2 Turns(QC):  4


Gait Persons Needed:  1


Gait Assistive Device:  FWW


instruction in NWBing and using UEs for wt bearing on FWW, instruction for 

backing up, turning etc while maintaining NWB Left





Exercises


Supine Ex:  Straight leg raise (left)


Supine Reps:  5





Treatments


pt. very pan sensitive and stops several times during Rx to renegotiate whether 

he is going to continue.  Pt.needs max encouragement to cont. sit to stand , 

gait 50 ft, 10 ft CGA, FWW NWB left maintained well, SLR left with mod assist x 

3-5 reps per tolerance, pt. seems a bit dramatic





Assessment


Current Status:  Good Progress





PT Long Term Goals


Long Term Goals


PT Long Term Goals Time Frame:  Mar 23, 2022


Roll Left & Right (QC):  6


Sit to Lying (QC):  6


Lying-Sitting on Side/Bed(QC):  6


Sit to Stand (QC):  6


Chair/Bed-to-Chair Xfer(QC):  6


Walk 10 feet (QC):  4





PT Plan


Treatment/Plan


Treatment Plan:  Continue Plan of Care


Treatment Plan:  Bed Mobility, Education, Functional Activity Vera, Functional 

Strength, Gait, Safety, Therapeutic Exercise, Transfers


Treatment Duration:  Mar 23, 2022


Frequency:  11 times per week


Estimated Hrs Per Day:  .25 hour per day


Patient and/or Family Agrees t:  Yes





Safety Risks/Education


Patient Education:  Gait Training, Transfer Techniques, Correct Positioning, 

Disease Process, Safety Issues


Teaching Recipient:  Patient, Family


Teaching Methods:  Demonstration, Discussion


Response to Teaching:  Verbalize Understanding, Return Demonstration, 

Reinforcement Needed





Time/GCodes


Time In:  1320


Time Out:  1340


Total Billed Treatment Time:  20


Total Billed Treatment


1,GT20 m











MARIZOL HICKS PTA             Mar 16, 2022 13:55

## 2022-03-17 VITALS — SYSTOLIC BLOOD PRESSURE: 140 MMHG | DIASTOLIC BLOOD PRESSURE: 83 MMHG

## 2022-03-17 VITALS — DIASTOLIC BLOOD PRESSURE: 83 MMHG | SYSTOLIC BLOOD PRESSURE: 140 MMHG

## 2022-03-17 VITALS — SYSTOLIC BLOOD PRESSURE: 134 MMHG | DIASTOLIC BLOOD PRESSURE: 86 MMHG

## 2022-03-17 VITALS — DIASTOLIC BLOOD PRESSURE: 98 MMHG | SYSTOLIC BLOOD PRESSURE: 157 MMHG

## 2022-03-17 LAB
HCT VFR BLD CALC: 40 % (ref 40–54)
HGB BLD-MCNC: 12.9 G/DL (ref 13.3–17.7)

## 2022-03-17 RX ADMIN — OXYCODONE HYDROCHLORIDE AND ACETAMINOPHEN PRN TAB: 5; 325 TABLET ORAL at 09:00

## 2022-03-17 RX ADMIN — ASPIRIN SCH MG: 81 TABLET ORAL at 09:00

## 2022-03-17 RX ADMIN — OXYCODONE HYDROCHLORIDE AND ACETAMINOPHEN PRN TAB: 5; 325 TABLET ORAL at 04:53

## 2022-03-17 NOTE — DISCHARGE SUMMARY
Discharge Summary


Hospital Course


Problems/Dx:  


(1) Fracture of left tibia and fibula


Status:  Acute


Qualifiers:  


   Qualified Codes:  S82.202A - Unspecified fracture of shaft of left tibia, 

initial encounter for closed fracture; S82.402A - Unspecified fracture of shaft 

of left fibula, initial encounter for closed fracture


(2) HTN (hypertension)


Status:  Acute


Qualifiers:  


   Qualified Codes:  I10 - Essential (primary) hypertension


Hospital Course


Date of Admission: Mar 15, 2022 at 06:50 


Admission Diagnosis :  Closed midshaft fracture left tibia and fibula





[facility]  





Date of Discharge: 3/17/22 


Discharge Diagnosis: [Closed midshaft fracture left tibia and fibula status post

 closed IM rodding left tibia ]








Hospital Course:The patient was admitted through the emergency room on 

3/14/2022.  He had been drinking most of the night and went out for a walk and 

stepped in a hole.  X-rays in the emergency room showed a displaced fracture of 

the midshaft left tibia and fibula.  He was splinted and admitted.  He was taken

to the operating room that same morning and underwent a closed IM rodding of the

left tibia.  He did well with the surgery.  Postop he had expected pain.  No 

evidence of compartment syndrome.  He was started on physical therapy 

walker/crutch ambulation nonweightbearing on the left.  He progressed slowly but

on the second postoperative day he was doing fairly well and ready for 

discharge.  He was initially started on IV pain medication then converted over 

to oral medication.


I also had Dr. Champagne see him as his blood pressure was elevated and he started 

him on lisinopril.  He will continue with this after discharge.


He has a follow-up appointment in the clinic on 3/21/2022.  He is to follow-up 

with family physician at approximately 1 week postop for follow-up on his 

hypertension.


His hemoglobin dropped postop but expected amount.


His dressing was changed to his anterior knee prior to discharge and it showed 

no redness or drainage.


[ ]














Labs and Pending Lab Test:


Laboratory Tests


3/17/22 06:12: 


Hemoglobin 12.9L, Hematocrit 40





Microbiology


3/15/22 MRSA Screen - Final, Complete


          MRSA not isolated





Home Meds


Active


Lisinopril 20 Mg Tablet 20 Mg PO 1000 30 Days


Assessment/Pt Instructions


Doing well 2 days postop





Discharge Instructions


Discharge Diet:  No Restrictions


Activity as Tolerated:  No





Discharge Physical Examination


Vital Signs





Vital Signs








  Date Time  Temp Pulse Resp B/P (MAP) Pulse Ox O2 Delivery O2 Flow Rate FiO2


 


3/17/22 08:14 36.4 96 20 134/86 (102) 94 Room Air  


 


3/15/22 15:30       2.50 








Allergies:  


Coded Allergies:  


     No Known Drug Allergies (Unverified , 10/1/11)





Discharge Summary


Date of Admission


Mar 15, 2022 at 06:50


Date of Discharge





Discharge Diagnosis





(1) Fracture of left tibia and fibula


Status:  Acute


Qualifiers:  


   Qualified Codes:  S82.202A - Unspecified fracture of shaft of left tibia, 

initial encounter for closed fracture; S82.402A - Unspecified fracture of shaft 

of left fibula, initial encounter for closed fracture


(2) HTN (hypertension)


Status:  Acute


Qualifiers:  


   Qualified Codes:  I10 - Essential (primary) hypertension











SCHWAB,TERRY D MD              Mar 17, 2022 10:33

## 2022-03-17 NOTE — PHYSICAL THERAPY DAILY NOTE
PT Daily Note-Current


Subjective


Patient was just waking up prior to treatment. Patient reports the pain has 

traveled more distally down the L lower leg. patient verbalized consent to 

treat.





Pain





   Numeric Pain Scale:  8





Mental Status


Patient Orientation:  Person, Place, Situation





Transfers


SCALE: Activities may be completed with or without assistive devices.





6-Indepedent-patient completes the activity by him/herself with no assistance 

from a helper.


5-Set-up or Clean-up Assistance-helper sets up or cleans up; patient completes 

activity. [place] assists only prior to or  


    following the activity.


4-Supervision or Touching Assistance-helper provides verbal cues and/or 

touching/steadying and/or contact guard assistance as patient completes a

ctivity. Assistance may be provided   


    throughout the activity or intermittently.


3-Partial/Moderate Assistance-helper does LESS THAN HALF the effort. [place] 

lifts, holds or supports trunk or limbs, but provides less than half the effort.


2-Substantial/Maximal Assistance-helper does MORE THAN HALF the effort. [place] 

lifts or holds trunk or limbs and provides more than half the effort.


2-Wbzrylveu-cisscn does ALL the effort. Patient does none of the effort to 

complete the activity. Or, the assistance of 2 or more helpers is required for 

the patient to complete the  


    activity.


If activity was not attempted, code reason:


7-Patient Refused.


9-Not Applicable-not attempted and the patient did not perform the activity 

before the current illness, exacerbation or injury.


10-Not Attempted due to Environmental Limitations-(lack of equipment, weather 

restraints, etc.).


88-Not Attempted due to Medical Conditions or Safety Concerns.


Lying to Sitting/Side of Bed(Q:  4


Sit to Stand (QC):  6





Weight Bearing


Right Lower Extremity:  Right


Full Weight Bearing


Left Lower Extremity:  Left


Non Weight Bearing





Gait Training


Walk 10 feet (QC):  4


Walk 50 ft with 2 Turns(QC):  4


Gait Assistive Device:  FWW


CGA, slow but steady ambulation, compliant with NWB on left leg





Wheelchair Training


Does the Pt Use a Wheelchair?:  No


Type of Wheelchair:  N/A





Exercises


Seated Therapy Exercises:  Long arc quads (Active assisted 10 reps), Hip flexion

(5 reps)





Treatments


LE strenght, mobility, ROM





Assessment


Current Status:  Good Progress


Upon lying->sit, patient reported pain in LLE and required assistance to scoot 

the left leg off the bed. Patient walked a distance of 100ft with walker with 

one rest break, without assistance from PT. Patient was able to do toileting 

independently. During hip flexion exercises, patient was  unable to correctly 

motor plan to initiate movement, and required verbal and tactile cues, but still

lacked strenght to produce the movement. Patient also lacked strength with LAQ 

and required active assistance. Patient could only get to lacking 45 degrees of 

full knee extension. Patient was left seated in chair with all needs met.





PT Long Term Goals


Long Term Goals


PT Long Term Goals Time Frame:  Mar 23, 2022


Roll Left & Right (QC):  6


Sit to Lying (QC):  6


Lying-Sitting on Side/Bed(QC):  6


Sit to Stand (QC):  6


Chair/Bed-to-Chair Xfer(QC):  6


Walk 10 feet (QC):  4





PT Plan


Problem List


Problem List:  Activity Tolerance, Functional Strength, Safety, Balance, Gait, 

Transfer, Bed Mobility, ROM





Treatment/Plan


Treatment Plan:  Continue Plan of Care


Treatment Plan:  Bed Mobility, Education, Functional Activity Vera, Functional 

Strength, Gait, Safety, Therapeutic Exercise, Transfers


Treatment Duration:  Mar 23, 2022


Frequency:  11 times per week


Estimated Hrs Per Day:  .25 hour per day


Patient and/or Family Agrees t:  Yes





Safety Risks/Education


Patient Education:  Gait Training, Transfer Techniques, Reviewed Precautions, 

Correct Positioning, Safety Issues


Teaching Recipient:  Patient


Teaching Methods:  Demonstration, Discussion


Response to Teaching:  Verbalize Understanding





Time/GCodes


Time In:  0910


Time Out:  0925


Total Billed Treatment Time:  15


Total Billed Treatment


1 visit


FA 15'











KRTEK,BRUNA PT                Mar 17, 2022 09:46

## 2022-03-17 NOTE — DISCHARGE INST-SIMPLE/STANDARD
Discharge Inst-Standard


Reconcile Patient Problems


Problems Reviewed?:  Yes





Discharge Medications


New, Converted or Re-Newed RX:  Transmitted to Pharmacy





Patient Instructions/Follow Up


Plan of Care/Instructions/FU:  


Has follow-up appointment in orthopedic clinic on 3/21/2022


Continue with walker or crutch ambulation nonweightbearing on the left


Oxycodone 5/325 #41 p.o. every 4 hours as needed pain


Continue with lisinopril as per Dr. Champagne


Follow-up appointment with family physician in approximately 1 week


Continue elevation and ice left leg


Activity as Tolerated:  No


Discharge Diet:  No Restrictions











SCHWAB,TERRY D MD              Mar 17, 2022 10:26

## 2022-03-17 NOTE — PROGRESS NOTE - ORTHO
Progress Note


Subjective


Date of Exam


3/17/22


Chief Complaint


POD#2 Closed IM rodding midshaft fracture left tibia and fibula


HPI/Events since last exam


Memo is doing better at 2 days postop.  He is having less pain.  He is up 

ambulating better today.  Still has some mild pain.  No other complaints.


Review of Systems


Reviewed and no additions or changes


Allergies:  


Coded Allergies:  


     No Known Drug Allergies (Unverified , 10/1/11)


Home Meds


Active Scripts


Lisinopril (Lisinopril) 20 Mg Tablet, 20 MG PO 1000 for 30 Days, #30 TAB 0 

Refills


   Prov:AYDEN PIZANO MD         3/16/22





Objective


Exam


Constitutional: []


HEENT: []


Neck: []


Cardiovascular: []


Respiratory: []


Gastrointestinal: []


Genitourinary: []


Skin: []


Back/Spine: []


Extremities: [Splint is intact.  He can move his toes and has normal sensation 

with good cap refill.  Toes are pink and warm.


I change his dressing anterior wound left knee and there was no redness or 

drainage.]


Neurologic: []


Psychiatric: []


Hematologic/lymphatic/immunologic: []


Vital Signs





Vital Signs








  Date Time  Temp Pulse Resp B/P (MAP) Pulse Ox O2 Delivery O2 Flow Rate FiO2


 


3/17/22 08:14 36.4 96 20 134/86 (102) 94 Room Air  


 


3/17/22 08:00      Room Air  


 


3/17/22 04:25 36.7 84 18 157/98 (117) 96 Room Air  


 


3/16/22 23:35 36.5 72 16 119/69 (86) 94 Room Air  


 


3/16/22 20:11 37.0 107 20 134/83 (100) 95 Room Air  


 


3/16/22 20:00      Room Air  


 


3/16/22 16:25 36.3 61 20 136/76 (96) 97 Room Air  


 


3/16/22 13:34  96  151/96 (114)    


 


3/16/22 12:03 36.8 96 20 154/94 (114) 92 Room Air  














I & O 


 


 3/17/22





 07:00


 


Intake Total 3587 ml


 


Output Total 600 ml


 


Balance 2987 ml








Lab Results


Laboratory Tests


3/17/22 06:12: 


Hemoglobin 12.9L, Hematocrit 40





Microbiology


3/15/22 MRSA Screen - Final, Complete


          MRSA not isolated





Assessment and Plan


Assessment


Doing well second day postop


Problem List


Unchanged


Plan


Discharge.  Follow-up in the office on 3/21/2022.  Follow-up in 1 week for his 

hypertension with family physician.


Continue with oxycodone 5/325 for pain.  Continue with lisinopril for 

hypertension per Dr. Pizano





Final Diagonsis


Closed midshaft fracture left tibia and fibula status post closed IM rodding


Level of the visit:  Level 3





Diagnosis/Problems


Diagnosis/Problems





(1) Fracture of left tibia and fibula


Status:  Acute


Qualifiers:  


   Qualified Codes:  S82.202A - Unspecified fracture of shaft of left tibia, 

initial encounter for closed fracture; S82.402A - Unspecified fracture of shaft 

of left fibula, initial encounter for closed fracture


(2) HTN (hypertension)


Status:  Acute


Qualifiers:  


   Qualified Codes:  I10 - Essential (primary) hypertension











SCHWAB,TERRY D MD              Mar 17, 2022 10:35

## 2022-03-21 ENCOUNTER — HOSPITAL ENCOUNTER (OUTPATIENT)
Dept: HOSPITAL 75 - ORTHO | Age: 27
End: 2022-03-21
Attending: ORTHOPAEDIC SURGERY
Payer: COMMERCIAL

## 2022-03-21 DIAGNOSIS — S82.202A: Primary | ICD-10-CM
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
## 2022-03-30 ENCOUNTER — HOSPITAL ENCOUNTER (OUTPATIENT)
Dept: HOSPITAL 75 - ORTHO | Age: 27
End: 2022-03-30
Attending: ORTHOPAEDIC SURGERY
Payer: COMMERCIAL

## 2022-03-30 DIAGNOSIS — S82.252D: Primary | ICD-10-CM
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
## 2022-03-30 PROCEDURE — 73590 X-RAY EXAM OF LOWER LEG: CPT

## 2022-03-30 NOTE — DIAGNOSTIC IMAGING REPORT
INDICATION: Follow-up left leg fractures.



TIME OF EXAM: 10:20 a.m.



COMPARISON: Correlation is made with prior study 03/15/2022.



FINDINGS: Two views of the left tibia and fibula demonstrate an

intramedullary bebe transfixing the mid shaft left tibial

fracture. There is also a non-fixated mid shaft fibular fracture.

Overall alignment is anatomic.



IMPRESSION: Postop and post-traumatic changes to the tibia and

fibula, as described. Overall alignment is anatomic.



Dictated by: 



  Dictated on workstation # [id number]

## 2022-04-27 ENCOUNTER — HOSPITAL ENCOUNTER (OUTPATIENT)
Dept: HOSPITAL 75 - ORTHO | Age: 27
End: 2022-04-27
Attending: ORTHOPAEDIC SURGERY
Payer: COMMERCIAL
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
## 2022-04-27 DIAGNOSIS — S82.252D: Primary | ICD-10-CM

## 2022-04-27 PROCEDURE — 73590 X-RAY EXAM OF LOWER LEG: CPT

## 2022-04-27 NOTE — DIAGNOSTIC IMAGING REPORT
INDICATION: Left leg injury, IM bebe placement.



COMPARISON: 03/30/2022



FINDINGS: 

4 views left tibia-fibula demonstrate some callus formation

involving the tibial fracture site. Comminuted fracture of the

fibula is well aligned and unchanged. IM bebe is well-positioned.



 

IMPRESSION: Stable tibia fibula fractures. Status post IM bebe

placement.



Dictated by: 



  Dictated on workstation # [id number]

## 2022-04-29 ENCOUNTER — HOSPITAL ENCOUNTER (OUTPATIENT)
Dept: HOSPITAL 75 - REHAB | Age: 27
LOS: 1 days | Discharge: HOME | End: 2022-04-30
Attending: ORTHOPAEDIC SURGERY
Payer: COMMERCIAL
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
## 2022-04-29 DIAGNOSIS — S82.252D: Primary | ICD-10-CM

## 2022-05-18 ENCOUNTER — HOSPITAL ENCOUNTER (OUTPATIENT)
Dept: HOSPITAL 75 - ORTHO | Age: 27
End: 2022-05-18
Attending: ORTHOPAEDIC SURGERY
Payer: COMMERCIAL
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
## 2022-05-18 DIAGNOSIS — S82.252D: Primary | ICD-10-CM

## 2022-05-18 PROCEDURE — 73590 X-RAY EXAM OF LOWER LEG: CPT

## 2022-05-18 NOTE — DIAGNOSTIC IMAGING REPORT
INDICATION:  

Fracture, followup.



TECHNIQUE:  

AP and lateral views of the left tibia and fibula.  



CORRELATION STUDY:  

04/27/2022.



FINDINGS: 

Longstem intramedullary bebe with 2 proximal and 2 distal fixation

screws transfixing an obliquely oriented mid to distal tibial

shaft fracture is present. There is perhaps some early reparative

change with mild callus formation. Fracture line, however,

remains well-visualized. Alignment is unchanged and near

anatomic. Comminuted fractured of the left mid fibular shaft is

again demonstrated and overall unchanged. Trace periosteal

reaction with otherwise no significant interval healing changes.



IMPRESSION: 

Minimal early healing changes of the tibial and fibular

fractures. Fracture lines remain well visualized and are

unchanged in their alignment.



Dictated by: 



  Dictated on workstation # [id number]

## 2022-05-25 ENCOUNTER — HOSPITAL ENCOUNTER (OUTPATIENT)
Dept: HOSPITAL 75 - REHAB | Age: 27
LOS: 6 days | Discharge: HOME | End: 2022-05-31
Attending: ORTHOPAEDIC SURGERY
Payer: COMMERCIAL

## 2022-05-25 DIAGNOSIS — I10: ICD-10-CM

## 2022-05-25 DIAGNOSIS — S82.252D: Primary | ICD-10-CM
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
## 2022-06-08 ENCOUNTER — HOSPITAL ENCOUNTER (OUTPATIENT)
Dept: HOSPITAL 75 - ORTHO | Age: 27
End: 2022-06-08
Attending: ORTHOPAEDIC SURGERY
Payer: COMMERCIAL
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
## 2022-06-08 DIAGNOSIS — S82.252D: Primary | ICD-10-CM

## 2022-06-08 PROCEDURE — 73590 X-RAY EXAM OF LOWER LEG: CPT

## 2022-06-08 NOTE — DIAGNOSTIC IMAGING REPORT
INDICATION: Follow-up left leg fracture.



Time of Exam: 10:43 AM



Correlation is made with prior radiograph from 05/18/2022.



Intramedullary bebe transfixes the fracture of the distal tibia

near the junction of the mid and distal 3rd. Overall alignment is

anatomic. Fracture line remains clearly visible. There does

appear to be some callus formation. There is also a midshaft

fibular fracture which is not transfixed. Alignment appears

stable. Alignment at the knee and ankle is normal.



IMPRESSION: Tibial and fibular fractures, stable when compared

with prior examination from 05/18/2022. Fracture lines remain

clearly visible.



Dictated by: 



  Dictated on workstation # [id number]

## 2022-06-30 ENCOUNTER — HOSPITAL ENCOUNTER (OUTPATIENT)
Dept: HOSPITAL 75 - REHAB | Age: 27
Discharge: HOME | End: 2022-06-30
Attending: ORTHOPAEDIC SURGERY
Payer: COMMERCIAL
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
## 2022-06-30 DIAGNOSIS — I10: ICD-10-CM

## 2022-06-30 DIAGNOSIS — S82.252D: Primary | ICD-10-CM

## 2022-07-25 ENCOUNTER — HOSPITAL ENCOUNTER (OUTPATIENT)
Dept: HOSPITAL 75 - ORTHO | Age: 27
End: 2022-07-25
Attending: ORTHOPAEDIC SURGERY
Payer: COMMERCIAL

## 2022-07-25 ENCOUNTER — HOSPITAL ENCOUNTER (OUTPATIENT)
Dept: HOSPITAL 75 - REHAB | Age: 27
LOS: 6 days | Discharge: HOME | End: 2022-07-31
Attending: ORTHOPAEDIC SURGERY
Payer: COMMERCIAL

## 2022-07-25 DIAGNOSIS — S82.252D: Primary | ICD-10-CM

## 2022-07-25 DIAGNOSIS — I10: ICD-10-CM
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
## 2022-07-25 PROCEDURE — 73590 X-RAY EXAM OF LOWER LEG: CPT

## 2022-07-25 NOTE — DIAGNOSTIC IMAGING REPORT
INDICATION:  

Comminuted fracture of the left tibia with pinning



EXAMINATION:

Left tib/fib, 2 views.



FINDINGS:

A medullary nail is present in the tibia. There is anatomical

alignment. Interlocking screws are intact. There has been some

early callus formation forming across the tibial fracture. The

comminuted mid fibular shaft fracture is in good alignment

without significant callus formation at this time.



IMPRESSION:  

Anatomical alignment of the tibial and fibular fractures with

early bony callus formation noted.



Dictated by: 



  Dictated on workstation # [id number]